# Patient Record
Sex: FEMALE | Race: WHITE | NOT HISPANIC OR LATINO
[De-identification: names, ages, dates, MRNs, and addresses within clinical notes are randomized per-mention and may not be internally consistent; named-entity substitution may affect disease eponyms.]

---

## 2017-02-21 ENCOUNTER — APPOINTMENT (OUTPATIENT)
Dept: ENDOCRINOLOGY | Facility: CLINIC | Age: 57
End: 2017-02-21

## 2017-04-13 ENCOUNTER — APPOINTMENT (OUTPATIENT)
Dept: ENDOCRINOLOGY | Facility: CLINIC | Age: 57
End: 2017-04-13

## 2017-04-13 VITALS
HEART RATE: 71 BPM | HEIGHT: 64 IN | OXYGEN SATURATION: 98 % | WEIGHT: 168 LBS | SYSTOLIC BLOOD PRESSURE: 110 MMHG | BODY MASS INDEX: 28.68 KG/M2 | DIASTOLIC BLOOD PRESSURE: 70 MMHG

## 2017-07-05 ENCOUNTER — MESSAGE (OUTPATIENT)
Age: 57
End: 2017-07-05

## 2017-07-10 ENCOUNTER — APPOINTMENT (OUTPATIENT)
Dept: INTERNAL MEDICINE | Facility: CLINIC | Age: 57
End: 2017-07-10

## 2017-07-10 ENCOUNTER — NON-APPOINTMENT (OUTPATIENT)
Age: 57
End: 2017-07-10

## 2017-07-10 VITALS
DIASTOLIC BLOOD PRESSURE: 80 MMHG | SYSTOLIC BLOOD PRESSURE: 127 MMHG | HEIGHT: 64 IN | BODY MASS INDEX: 28.68 KG/M2 | TEMPERATURE: 98.9 F | HEART RATE: 71 BPM | RESPIRATION RATE: 17 BRPM | WEIGHT: 168 LBS | OXYGEN SATURATION: 99 %

## 2017-07-12 LAB
ALBUMIN SERPL ELPH-MCNC: 4.5 G/DL
ALP BLD-CCNC: 63 U/L
ALT SERPL-CCNC: 19 U/L
ANION GAP SERPL CALC-SCNC: 17 MMOL/L
AST SERPL-CCNC: 18 U/L
BASOPHILS # BLD AUTO: 0.02 K/UL
BASOPHILS NFR BLD AUTO: 0.3 %
BILIRUB SERPL-MCNC: 0.4 MG/DL
BUN SERPL-MCNC: 18 MG/DL
CALCIUM SERPL-MCNC: 9.8 MG/DL
CHLORIDE SERPL-SCNC: 100 MMOL/L
CO2 SERPL-SCNC: 25 MMOL/L
CREAT SERPL-MCNC: 0.8 MG/DL
EOSINOPHIL # BLD AUTO: 0.1 K/UL
EOSINOPHIL NFR BLD AUTO: 1.4 %
GLUCOSE SERPL-MCNC: 75 MG/DL
HCT VFR BLD CALC: 40.4 %
HGB BLD-MCNC: 13 G/DL
IMM GRANULOCYTES NFR BLD AUTO: 0.1 %
LYMPHOCYTES # BLD AUTO: 2.3 K/UL
LYMPHOCYTES NFR BLD AUTO: 31.6 %
MAN DIFF?: NORMAL
MCHC RBC-ENTMCNC: 27.5 PG
MCHC RBC-ENTMCNC: 32.2 GM/DL
MCV RBC AUTO: 85.4 FL
MONOCYTES # BLD AUTO: 0.36 K/UL
MONOCYTES NFR BLD AUTO: 5 %
NEUTROPHILS # BLD AUTO: 4.48 K/UL
NEUTROPHILS NFR BLD AUTO: 61.6 %
PLATELET # BLD AUTO: 399 K/UL
POTASSIUM SERPL-SCNC: 5 MMOL/L
PROT SERPL-MCNC: 7.1 G/DL
RBC # BLD: 4.73 M/UL
RBC # FLD: 14.1 %
SODIUM SERPL-SCNC: 142 MMOL/L
WBC # FLD AUTO: 7.27 K/UL

## 2017-07-26 ENCOUNTER — APPOINTMENT (OUTPATIENT)
Dept: CARDIOLOGY | Facility: CLINIC | Age: 57
End: 2017-07-26

## 2017-07-26 VITALS
RESPIRATION RATE: 16 BRPM | SYSTOLIC BLOOD PRESSURE: 126 MMHG | DIASTOLIC BLOOD PRESSURE: 86 MMHG | BODY MASS INDEX: 28.68 KG/M2 | WEIGHT: 168 LBS | OXYGEN SATURATION: 98 % | HEIGHT: 64 IN | HEART RATE: 82 BPM

## 2017-07-27 ENCOUNTER — OTHER (OUTPATIENT)
Age: 57
End: 2017-07-27

## 2017-07-31 ENCOUNTER — APPOINTMENT (OUTPATIENT)
Dept: CARDIOLOGY | Facility: CLINIC | Age: 57
End: 2017-07-31
Payer: COMMERCIAL

## 2017-07-31 PROCEDURE — 93306 TTE W/DOPPLER COMPLETE: CPT

## 2017-08-16 ENCOUNTER — APPOINTMENT (OUTPATIENT)
Dept: CARDIOLOGY | Facility: CLINIC | Age: 57
End: 2017-08-16

## 2017-08-23 ENCOUNTER — APPOINTMENT (OUTPATIENT)
Dept: CARDIOLOGY | Facility: CLINIC | Age: 57
End: 2017-08-23

## 2018-02-28 ENCOUNTER — MESSAGE (OUTPATIENT)
Age: 58
End: 2018-02-28

## 2018-03-20 ENCOUNTER — APPOINTMENT (OUTPATIENT)
Dept: INTERNAL MEDICINE | Facility: CLINIC | Age: 58
End: 2018-03-20
Payer: COMMERCIAL

## 2018-03-20 ENCOUNTER — LABORATORY RESULT (OUTPATIENT)
Age: 58
End: 2018-03-20

## 2018-03-20 VITALS
TEMPERATURE: 98.2 F | HEIGHT: 64 IN | RESPIRATION RATE: 17 BRPM | SYSTOLIC BLOOD PRESSURE: 132 MMHG | DIASTOLIC BLOOD PRESSURE: 80 MMHG | BODY MASS INDEX: 29.88 KG/M2 | HEART RATE: 60 BPM | OXYGEN SATURATION: 99 % | WEIGHT: 175 LBS

## 2018-03-20 DIAGNOSIS — Z80.8 FAMILY HISTORY OF MALIGNANT NEOPLASM OF OTHER ORGANS OR SYSTEMS: ICD-10-CM

## 2018-03-20 PROCEDURE — 99396 PREV VISIT EST AGE 40-64: CPT

## 2018-03-21 LAB
APPEARANCE: CLEAR
BILIRUBIN URINE: NEGATIVE
BLOOD URINE: ABNORMAL
COLOR: YELLOW
GLUCOSE QUALITATIVE U: NEGATIVE MG/DL
KETONES URINE: NEGATIVE
LEUKOCYTE ESTERASE URINE: NEGATIVE
NITRITE URINE: NEGATIVE
PH URINE: 5.5
PROTEIN URINE: NEGATIVE MG/DL
SPECIFIC GRAVITY URINE: 1.01
UROBILINOGEN URINE: NEGATIVE MG/DL

## 2018-03-22 PROBLEM — Z80.8 FAMILY HISTORY OF MALIGNANT MELANOMA OF SKIN: Status: ACTIVE | Noted: 2018-03-20

## 2018-06-26 ENCOUNTER — APPOINTMENT (OUTPATIENT)
Dept: ENDOCRINOLOGY | Facility: CLINIC | Age: 58
End: 2018-06-26
Payer: COMMERCIAL

## 2018-06-26 VITALS
DIASTOLIC BLOOD PRESSURE: 70 MMHG | WEIGHT: 171 LBS | SYSTOLIC BLOOD PRESSURE: 112 MMHG | RESPIRATION RATE: 16 BRPM | OXYGEN SATURATION: 99 % | BODY MASS INDEX: 29.19 KG/M2 | HEIGHT: 64 IN | HEART RATE: 82 BPM

## 2018-06-26 PROCEDURE — 99213 OFFICE O/P EST LOW 20 MIN: CPT

## 2018-08-09 ENCOUNTER — MESSAGE (OUTPATIENT)
Age: 58
End: 2018-08-09

## 2018-08-09 ENCOUNTER — APPOINTMENT (OUTPATIENT)
Dept: INTERNAL MEDICINE | Facility: CLINIC | Age: 58
End: 2018-08-09
Payer: COMMERCIAL

## 2018-08-09 PROCEDURE — 86580 TB INTRADERMAL TEST: CPT

## 2018-08-11 ENCOUNTER — APPOINTMENT (OUTPATIENT)
Dept: INTERNAL MEDICINE | Facility: CLINIC | Age: 58
End: 2018-08-11

## 2019-05-07 ENCOUNTER — APPOINTMENT (OUTPATIENT)
Dept: INTERNAL MEDICINE | Facility: CLINIC | Age: 59
End: 2019-05-07
Payer: COMMERCIAL

## 2019-05-07 ENCOUNTER — NON-APPOINTMENT (OUTPATIENT)
Age: 59
End: 2019-05-07

## 2019-05-07 VITALS
SYSTOLIC BLOOD PRESSURE: 137 MMHG | OXYGEN SATURATION: 100 % | TEMPERATURE: 98.5 F | HEART RATE: 70 BPM | WEIGHT: 182 LBS | DIASTOLIC BLOOD PRESSURE: 80 MMHG | BODY MASS INDEX: 31.07 KG/M2 | RESPIRATION RATE: 17 BRPM | HEIGHT: 64 IN

## 2019-05-07 PROCEDURE — 99243 OFF/OP CNSLTJ NEW/EST LOW 30: CPT | Mod: 25

## 2019-05-07 PROCEDURE — 93000 ELECTROCARDIOGRAM COMPLETE: CPT

## 2019-05-21 ENCOUNTER — TRANSCRIPTION ENCOUNTER (OUTPATIENT)
Age: 59
End: 2019-05-21

## 2019-05-24 ENCOUNTER — APPOINTMENT (OUTPATIENT)
Dept: INTERNAL MEDICINE | Facility: CLINIC | Age: 59
End: 2019-05-24
Payer: COMMERCIAL

## 2019-05-24 VITALS
WEIGHT: 180 LBS | RESPIRATION RATE: 17 BRPM | BODY MASS INDEX: 30.73 KG/M2 | HEIGHT: 64 IN | TEMPERATURE: 97.8 F | OXYGEN SATURATION: 99 % | DIASTOLIC BLOOD PRESSURE: 80 MMHG | HEART RATE: 68 BPM | SYSTOLIC BLOOD PRESSURE: 138 MMHG

## 2019-05-24 VITALS — DIASTOLIC BLOOD PRESSURE: 80 MMHG | SYSTOLIC BLOOD PRESSURE: 132 MMHG

## 2019-05-24 PROCEDURE — 99396 PREV VISIT EST AGE 40-64: CPT

## 2019-05-27 ENCOUNTER — MESSAGE (OUTPATIENT)
Age: 59
End: 2019-05-27

## 2019-05-27 LAB
25(OH)D3 SERPL-MCNC: 42.6 NG/ML
ALBUMIN SERPL ELPH-MCNC: 4.7 G/DL
ALP BLD-CCNC: 73 U/L
ALT SERPL-CCNC: 26 U/L
ANION GAP SERPL CALC-SCNC: 14 MMOL/L
APPEARANCE: CLEAR
AST SERPL-CCNC: 19 U/L
BASOPHILS # BLD AUTO: 0.04 K/UL
BASOPHILS NFR BLD AUTO: 0.6 %
BILIRUB SERPL-MCNC: 0.4 MG/DL
BILIRUBIN URINE: NEGATIVE
BLOOD URINE: NEGATIVE
BUN SERPL-MCNC: 24 MG/DL
CALCIUM SERPL-MCNC: 10.3 MG/DL
CHLORIDE SERPL-SCNC: 100 MMOL/L
CHOLEST SERPL-MCNC: 276 MG/DL
CHOLEST/HDLC SERPL: 4.8 RATIO
CO2 SERPL-SCNC: 25 MMOL/L
COLOR: NORMAL
CREAT SERPL-MCNC: 0.86 MG/DL
EOSINOPHIL # BLD AUTO: 0.08 K/UL
EOSINOPHIL NFR BLD AUTO: 1.3 %
ESTIMATED AVERAGE GLUCOSE: 103 MG/DL
GLUCOSE QUALITATIVE U: NEGATIVE
GLUCOSE SERPL-MCNC: 85 MG/DL
HBA1C MFR BLD HPLC: 5.2 %
HCT VFR BLD CALC: 47.1 %
HDLC SERPL-MCNC: 57 MG/DL
HGB BLD-MCNC: 14.9 G/DL
IMM GRANULOCYTES NFR BLD AUTO: 0.6 %
KETONES URINE: NEGATIVE
LDLC SERPL CALC-MCNC: 196 MG/DL
LEUKOCYTE ESTERASE URINE: NEGATIVE
LYMPHOCYTES # BLD AUTO: 1.66 K/UL
LYMPHOCYTES NFR BLD AUTO: 26.1 %
MAN DIFF?: NORMAL
MCHC RBC-ENTMCNC: 28.2 PG
MCHC RBC-ENTMCNC: 31.6 GM/DL
MCV RBC AUTO: 89.2 FL
MONOCYTES # BLD AUTO: 0.35 K/UL
MONOCYTES NFR BLD AUTO: 5.5 %
NEUTROPHILS # BLD AUTO: 4.18 K/UL
NEUTROPHILS NFR BLD AUTO: 65.9 %
NITRITE URINE: NEGATIVE
PH URINE: 7
PLATELET # BLD AUTO: 356 K/UL
POTASSIUM SERPL-SCNC: 5.2 MMOL/L
PROT SERPL-MCNC: 7.5 G/DL
PROTEIN URINE: NORMAL
RBC # BLD: 5.28 M/UL
RBC # FLD: 12.8 %
SODIUM SERPL-SCNC: 139 MMOL/L
SPECIFIC GRAVITY URINE: 1.02
T4 FREE SERPL-MCNC: 1.2 NG/DL
TRIGL SERPL-MCNC: 113 MG/DL
TSH SERPL-ACNC: 1.76 UIU/ML
UROBILINOGEN URINE: NORMAL
VIT B12 SERPL-MCNC: 773 PG/ML
WBC # FLD AUTO: 6.35 K/UL

## 2019-05-30 LAB
M TB IFN-G BLD-IMP: NEGATIVE
QUANTIFERON TB PLUS MITOGEN MINUS NIL: 9.3 IU/ML
QUANTIFERON TB PLUS NIL: 0.02 IU/ML
QUANTIFERON TB PLUS TB1 MINUS NIL: 0 IU/ML
QUANTIFERON TB PLUS TB2 MINUS NIL: 0 IU/ML

## 2019-08-30 ENCOUNTER — RESULT REVIEW (OUTPATIENT)
Age: 59
End: 2019-08-30

## 2019-10-01 ENCOUNTER — APPOINTMENT (OUTPATIENT)
Dept: ORTHOPEDIC SURGERY | Facility: CLINIC | Age: 59
End: 2019-10-01
Payer: COMMERCIAL

## 2019-10-01 VITALS
WEIGHT: 180 LBS | HEART RATE: 72 BPM | DIASTOLIC BLOOD PRESSURE: 89 MMHG | SYSTOLIC BLOOD PRESSURE: 162 MMHG | BODY MASS INDEX: 30.73 KG/M2 | HEIGHT: 64 IN

## 2019-10-01 PROCEDURE — 99203 OFFICE O/P NEW LOW 30 MIN: CPT

## 2019-10-07 ENCOUNTER — MESSAGE (OUTPATIENT)
Age: 59
End: 2019-10-07

## 2019-10-31 ENCOUNTER — APPOINTMENT (OUTPATIENT)
Dept: ORTHOPEDIC SURGERY | Facility: CLINIC | Age: 59
End: 2019-10-31
Payer: COMMERCIAL

## 2019-10-31 PROCEDURE — 99213 OFFICE O/P EST LOW 20 MIN: CPT

## 2019-11-04 ENCOUNTER — APPOINTMENT (OUTPATIENT)
Dept: INTERNAL MEDICINE | Facility: CLINIC | Age: 59
End: 2019-11-04
Payer: COMMERCIAL

## 2019-11-04 VITALS
WEIGHT: 180 LBS | BODY MASS INDEX: 30.73 KG/M2 | OXYGEN SATURATION: 98 % | HEART RATE: 73 BPM | HEIGHT: 64 IN | SYSTOLIC BLOOD PRESSURE: 144 MMHG | DIASTOLIC BLOOD PRESSURE: 91 MMHG | TEMPERATURE: 98 F

## 2019-11-04 VITALS — DIASTOLIC BLOOD PRESSURE: 72 MMHG | SYSTOLIC BLOOD PRESSURE: 126 MMHG

## 2019-11-04 PROCEDURE — 99215 OFFICE O/P EST HI 40 MIN: CPT

## 2019-12-05 ENCOUNTER — APPOINTMENT (OUTPATIENT)
Dept: ORTHOPEDIC SURGERY | Facility: CLINIC | Age: 59
End: 2019-12-05
Payer: COMMERCIAL

## 2019-12-05 VITALS
HEIGHT: 64 IN | HEART RATE: 67 BPM | WEIGHT: 180 LBS | SYSTOLIC BLOOD PRESSURE: 130 MMHG | BODY MASS INDEX: 30.73 KG/M2 | DIASTOLIC BLOOD PRESSURE: 83 MMHG

## 2019-12-05 PROCEDURE — 99214 OFFICE O/P EST MOD 30 MIN: CPT

## 2020-08-13 ENCOUNTER — NON-APPOINTMENT (OUTPATIENT)
Age: 60
End: 2020-08-13

## 2020-08-13 ENCOUNTER — APPOINTMENT (OUTPATIENT)
Dept: INTERNAL MEDICINE | Facility: CLINIC | Age: 60
End: 2020-08-13
Payer: COMMERCIAL

## 2020-08-13 VITALS
WEIGHT: 180 LBS | TEMPERATURE: 97.6 F | SYSTOLIC BLOOD PRESSURE: 128 MMHG | HEART RATE: 81 BPM | OXYGEN SATURATION: 98 % | BODY MASS INDEX: 30.73 KG/M2 | RESPIRATION RATE: 16 BRPM | HEIGHT: 64 IN | DIASTOLIC BLOOD PRESSURE: 80 MMHG

## 2020-08-13 DIAGNOSIS — S60.221A CONTUSION OF RIGHT HAND, INITIAL ENCOUNTER: ICD-10-CM

## 2020-08-13 DIAGNOSIS — Z86.69 PERSONAL HISTORY OF OTHER DISEASES OF THE NERVOUS SYSTEM AND SENSE ORGANS: ICD-10-CM

## 2020-08-13 DIAGNOSIS — Z01.818 ENCOUNTER FOR OTHER PREPROCEDURAL EXAMINATION: ICD-10-CM

## 2020-08-13 DIAGNOSIS — R79.9 ABNORMAL FINDING OF BLOOD CHEMISTRY, UNSPECIFIED: ICD-10-CM

## 2020-08-13 DIAGNOSIS — T14.8XXA OTHER INJURY OF UNSPECIFIED BODY REGION, INITIAL ENCOUNTER: ICD-10-CM

## 2020-08-13 DIAGNOSIS — Z00.00 ENCOUNTER FOR GENERAL ADULT MEDICAL EXAMINATION W/OUT ABNORMAL FINDINGS: ICD-10-CM

## 2020-08-13 DIAGNOSIS — R82.90 UNSPECIFIED ABNORMAL FINDINGS IN URINE: ICD-10-CM

## 2020-08-13 DIAGNOSIS — M66.242 SPONTANEOUS RUPTURE OF EXTENSOR TENDONS, LEFT HAND: ICD-10-CM

## 2020-08-13 DIAGNOSIS — Z87.898 PERSONAL HISTORY OF OTHER SPECIFIED CONDITIONS: ICD-10-CM

## 2020-08-13 DIAGNOSIS — Z86.018 PERSONAL HISTORY OF OTHER BENIGN NEOPLASM: ICD-10-CM

## 2020-08-13 PROCEDURE — 99396 PREV VISIT EST AGE 40-64: CPT | Mod: 25

## 2020-08-13 PROCEDURE — G0442 ANNUAL ALCOHOL SCREEN 15 MIN: CPT | Mod: NC

## 2020-08-13 PROCEDURE — G0444 DEPRESSION SCREEN ANNUAL: CPT | Mod: NC

## 2020-08-13 PROCEDURE — G0447 BEHAVIOR COUNSEL OBESITY 15M: CPT | Mod: NC

## 2020-08-13 PROCEDURE — 93000 ELECTROCARDIOGRAM COMPLETE: CPT

## 2020-09-02 ENCOUNTER — APPOINTMENT (OUTPATIENT)
Dept: INTERNAL MEDICINE | Facility: CLINIC | Age: 60
End: 2020-09-02

## 2020-09-03 ENCOUNTER — APPOINTMENT (OUTPATIENT)
Dept: OTOLARYNGOLOGY | Facility: CLINIC | Age: 60
End: 2020-09-03
Payer: COMMERCIAL

## 2020-09-03 VITALS
HEIGHT: 64 IN | WEIGHT: 180 LBS | BODY MASS INDEX: 30.73 KG/M2 | TEMPERATURE: 97.2 F | HEART RATE: 74 BPM | SYSTOLIC BLOOD PRESSURE: 147 MMHG | DIASTOLIC BLOOD PRESSURE: 90 MMHG

## 2020-09-03 PROCEDURE — 92557 COMPREHENSIVE HEARING TEST: CPT

## 2020-09-03 PROCEDURE — 99204 OFFICE O/P NEW MOD 45 MIN: CPT | Mod: 25

## 2020-09-03 PROCEDURE — 92567 TYMPANOMETRY: CPT

## 2020-09-03 PROCEDURE — 69200 CLEAR OUTER EAR CANAL: CPT | Mod: RT

## 2020-09-03 NOTE — HISTORY OF PRESENT ILLNESS
[de-identified] : pt with tinnitus b/l x year worse over the past few months b/l \par also with hearing loss b/l over the past few months getting worse

## 2020-09-03 NOTE — CONSULT LETTER
[FreeTextEntry1] : Dear Dr. IDALIA ELLER \par I had the pleasure of evaluating your patient CARY RODRIGEZ, thank you for allowing us to participate in their care. please see full note detailing our visit below.\par If you have any questions, please do not hesitate to call me and I would be happy to discuss further. \par \par Kyle Schultz M.D.\par Attending Physician,  \par Department of Otolaryngology - Head and Neck Surgery\par [facility] \par Office: [phone]\par Fax: [phone]\par \par

## 2020-09-03 NOTE — PHYSICAL EXAM
[de-identified] : cotton on TM on right  [Midline] : trachea located in midline position [Normal] : orientation to person, place, and time: normal

## 2020-09-03 NOTE — ASSESSMENT
[FreeTextEntry1] : Mild bilateral sensory neural hearing loss on audiological evaluation. At this point clinically not severe and it does not significant limitation in function, discuses what to look for in regards to signs of worsening hearing loss that may require re-evaluation. Also discussed behavioral techniques and environmental controls for improving function . They will follow up as needed or if hearing gets worse.\par \par patient with tone tinnitus - discussed pathophysiology of tinnitus and usual prognosis and treatment. will try otovits and masking techniques. If persist and bothersome can try pitch therapy, or acupuncture\par  right ear cleared out, no more q-tips \par

## 2020-09-03 NOTE — PROCEDURE
[FreeTextEntry3] : Procedure- Removal of right ear FB \par Diagnosis - right ear FB \par Right ear found to have left ear FB, under microscopy removed under direct vision with alligator, canal appeared normal.\par

## 2020-09-16 ENCOUNTER — APPOINTMENT (OUTPATIENT)
Dept: CARDIOLOGY | Facility: CLINIC | Age: 60
End: 2020-09-16
Payer: COMMERCIAL

## 2020-09-16 ENCOUNTER — NON-APPOINTMENT (OUTPATIENT)
Age: 60
End: 2020-09-16

## 2020-09-16 VITALS
HEIGHT: 64 IN | OXYGEN SATURATION: 98 % | SYSTOLIC BLOOD PRESSURE: 147 MMHG | HEART RATE: 69 BPM | DIASTOLIC BLOOD PRESSURE: 84 MMHG | BODY MASS INDEX: 30.73 KG/M2 | WEIGHT: 180 LBS

## 2020-09-16 PROCEDURE — 99213 OFFICE O/P EST LOW 20 MIN: CPT

## 2020-09-16 PROCEDURE — 93000 ELECTROCARDIOGRAM COMPLETE: CPT

## 2020-09-16 RX ORDER — BESIFLOXACIN 6 MG/ML
0.6 SUSPENSION OPHTHALMIC
Qty: 5 | Refills: 0 | Status: DISCONTINUED | COMMUNITY
Start: 2019-05-06 | End: 2020-09-16

## 2020-09-16 RX ORDER — DIFLUPREDNATE 0.5 MG/ML
0.05 EMULSION OPHTHALMIC
Qty: 5 | Refills: 0 | Status: DISCONTINUED | COMMUNITY
Start: 2019-05-06 | End: 2020-09-16

## 2020-09-16 RX ORDER — BROMFENAC 0.76 MG/ML
0.07 SOLUTION/ DROPS OPHTHALMIC
Qty: 5 | Refills: 0 | Status: DISCONTINUED | COMMUNITY
Start: 2019-06-14 | End: 2020-09-16

## 2020-09-16 NOTE — PHYSICAL EXAM
[Normal Appearance] : normal appearance [General Appearance - Well Developed] : well developed [Well Groomed] : well groomed [No Deformities] : no deformities [General Appearance - Well Nourished] : well nourished [Normal Conjunctiva] : the conjunctiva exhibited no abnormalities [General Appearance - In No Acute Distress] : no acute distress [Eyelids - No Xanthelasma] : the eyelids demonstrated no xanthelasmas [No Oral Pallor] : no oral pallor [No Oral Cyanosis] : no oral cyanosis [Normal Oral Mucosa] : normal oral mucosa [No Jugular Venous [place] A Waves] : no jugular venous richmond A waves [Normal Jugular Venous A Waves Present] : normal jugular venous A waves present [Normal Jugular Venous V Waves Present] : normal jugular venous V waves present [Exaggerated Use Of Accessory Muscles For Inspiration] : no accessory muscle use [Respiration, Rhythm And Depth] : normal respiratory rhythm and effort [Heart Rate And Rhythm] : heart rate and rhythm were normal [Heart Sounds] : normal S1 and S2 [Murmurs] : no murmurs present [Auscultation Breath Sounds / Voice Sounds] : lungs were clear to auscultation bilaterally [Abdomen Soft] : soft [Abdomen Tenderness] : non-tender [Gait - Sufficient For Exercise Testing] : the gait was sufficient for exercise testing [Abdomen Mass (___ Cm)] : no abdominal mass palpated [Abnormal Walk] : normal gait [Petechial Hemorrhages (___cm)] : no petechial hemorrhages [Nail Clubbing] : no clubbing of the fingernails [Cyanosis, Localized] : no localized cyanosis [Skin Color & Pigmentation] : normal skin color and pigmentation [Skin Lesions] : no skin lesions [] : no rash [No Venous Stasis] : no venous stasis [Oriented To Time, Place, And Person] : oriented to person, place, and time [No Xanthoma] : no  xanthoma was observed [No Skin Ulcers] : no skin ulcer [Mood] : the mood was normal [Affect] : the affect was normal [No Anxiety] : not feeling anxious

## 2020-09-19 NOTE — REASON FOR VISIT
[Syncope] : syncope [Follow-Up - Clinic] : a clinic follow-up of [Medication Management] : Medication management

## 2020-09-19 NOTE — HISTORY OF PRESENT ILLNESS
[FreeTextEntry1] : Bianka is a 56 y/o woman with Hchol returning for repeat eval\par \par No CP\par No LE edema\par No symptoms\par No syncope\par Labs reviewed\par \par \par

## 2020-09-19 NOTE — DISCUSSION/SUMMARY
[FreeTextEntry1] : Repeat Echo to f/u on thickened mitral valve seen previously\par Discussed her risk of CAD and atherosclerosis with her high chol\par She refuses meds at this time and has agreed to try diet and exercise\par She will recheck her lipids in 2 mo\par She understands that with this level of Chol she puts herself at risk for stroke and MI\par All her questions were answered.

## 2020-11-01 ENCOUNTER — EMERGENCY (EMERGENCY)
Facility: HOSPITAL | Age: 60
LOS: 1 days | Discharge: HOME | End: 2020-11-01
Attending: EMERGENCY MEDICINE
Payer: COMMERCIAL

## 2020-11-01 VITALS
HEIGHT: 64 IN | RESPIRATION RATE: 20 BRPM | TEMPERATURE: 98 F | HEART RATE: 74 BPM | DIASTOLIC BLOOD PRESSURE: 80 MMHG | SYSTOLIC BLOOD PRESSURE: 132 MMHG | OXYGEN SATURATION: 100 % | WEIGHT: 179.9 LBS

## 2020-11-01 VITALS
OXYGEN SATURATION: 100 % | HEART RATE: 82 BPM | DIASTOLIC BLOOD PRESSURE: 75 MMHG | SYSTOLIC BLOOD PRESSURE: 122 MMHG | TEMPERATURE: 98 F | RESPIRATION RATE: 16 BRPM

## 2020-11-01 LAB
ALBUMIN SERPL ELPH-MCNC: 4 G/DL — SIGNIFICANT CHANGE UP (ref 3.3–5)
ALP SERPL-CCNC: 73 U/L — SIGNIFICANT CHANGE UP (ref 40–120)
ALT FLD-CCNC: 33 U/L — SIGNIFICANT CHANGE UP (ref 10–45)
ANION GAP SERPL CALC-SCNC: 12 MMOL/L — SIGNIFICANT CHANGE UP (ref 5–17)
AST SERPL-CCNC: 28 U/L — SIGNIFICANT CHANGE UP (ref 10–40)
BILIRUB SERPL-MCNC: 0.1 MG/DL — LOW (ref 0.2–1.2)
BUN SERPL-MCNC: 22 MG/DL — SIGNIFICANT CHANGE UP (ref 7–23)
CALCIUM SERPL-MCNC: 8.6 MG/DL — SIGNIFICANT CHANGE UP (ref 8.4–10.5)
CHLORIDE SERPL-SCNC: 103 MMOL/L — SIGNIFICANT CHANGE UP (ref 96–108)
CO2 SERPL-SCNC: 24 MMOL/L — SIGNIFICANT CHANGE UP (ref 22–31)
CREAT SERPL-MCNC: 0.89 MG/DL — SIGNIFICANT CHANGE UP (ref 0.5–1.3)
GLUCOSE SERPL-MCNC: 163 MG/DL — HIGH (ref 70–99)
HCT VFR BLD CALC: 42.5 % — SIGNIFICANT CHANGE UP (ref 34.5–45)
HGB BLD-MCNC: 13.6 G/DL — SIGNIFICANT CHANGE UP (ref 11.5–15.5)
MCHC RBC-ENTMCNC: 28.6 PG — SIGNIFICANT CHANGE UP (ref 27–34)
MCHC RBC-ENTMCNC: 32 GM/DL — SIGNIFICANT CHANGE UP (ref 32–36)
MCV RBC AUTO: 89.5 FL — SIGNIFICANT CHANGE UP (ref 80–100)
NRBC # BLD: 0 /100 WBCS — SIGNIFICANT CHANGE UP (ref 0–0)
NT-PROBNP SERPL-SCNC: 41 PG/ML — SIGNIFICANT CHANGE UP (ref 0–300)
PLATELET # BLD AUTO: 299 K/UL — SIGNIFICANT CHANGE UP (ref 150–400)
POTASSIUM SERPL-MCNC: 3.8 MMOL/L — SIGNIFICANT CHANGE UP (ref 3.5–5.3)
POTASSIUM SERPL-SCNC: 3.8 MMOL/L — SIGNIFICANT CHANGE UP (ref 3.5–5.3)
PROT SERPL-MCNC: 6.4 G/DL — SIGNIFICANT CHANGE UP (ref 6–8.3)
RBC # BLD: 4.75 M/UL — SIGNIFICANT CHANGE UP (ref 3.8–5.2)
RBC # FLD: 12.6 % — SIGNIFICANT CHANGE UP (ref 10.3–14.5)
SODIUM SERPL-SCNC: 139 MMOL/L — SIGNIFICANT CHANGE UP (ref 135–145)
TROPONIN T, HIGH SENSITIVITY RESULT: <6 NG/L — SIGNIFICANT CHANGE UP (ref 0–51)
WBC # BLD: 8.78 K/UL — SIGNIFICANT CHANGE UP (ref 3.8–10.5)
WBC # FLD AUTO: 8.78 K/UL — SIGNIFICANT CHANGE UP (ref 3.8–10.5)

## 2020-11-01 PROCEDURE — 71045 X-RAY EXAM CHEST 1 VIEW: CPT | Mod: 26

## 2020-11-01 PROCEDURE — 85027 COMPLETE CBC AUTOMATED: CPT

## 2020-11-01 PROCEDURE — 83880 ASSAY OF NATRIURETIC PEPTIDE: CPT

## 2020-11-01 PROCEDURE — 99284 EMERGENCY DEPT VISIT MOD MDM: CPT | Mod: 25

## 2020-11-01 PROCEDURE — 93005 ELECTROCARDIOGRAM TRACING: CPT

## 2020-11-01 PROCEDURE — 99285 EMERGENCY DEPT VISIT HI MDM: CPT

## 2020-11-01 PROCEDURE — 84484 ASSAY OF TROPONIN QUANT: CPT

## 2020-11-01 PROCEDURE — 93010 ELECTROCARDIOGRAM REPORT: CPT | Mod: NC

## 2020-11-01 PROCEDURE — 71045 X-RAY EXAM CHEST 1 VIEW: CPT

## 2020-11-01 PROCEDURE — 80053 COMPREHEN METABOLIC PANEL: CPT

## 2020-11-01 RX ORDER — SODIUM CHLORIDE 9 MG/ML
1000 INJECTION, SOLUTION INTRAVENOUS ONCE
Refills: 0 | Status: COMPLETED | OUTPATIENT
Start: 2020-11-01 | End: 2020-11-01

## 2020-11-01 RX ADMIN — SODIUM CHLORIDE 4000 MILLILITER(S): 9 INJECTION, SOLUTION INTRAVENOUS at 06:04

## 2020-11-01 NOTE — ED PROVIDER NOTE - PHYSICAL EXAMINATION
Physical Exam:  Gen: NAD, AOx3, non-toxic appearing, able to ambulate without assistance  Head: NCAT  HEENT: EOMI, PEERLA, normal conjunctiva, tongue midline, oral mucosa moist  Lung: CTAB, no respiratory distress, no wheezes/rhonchi/rales B/L, speaking in full sentences  CV: RRR, no murmurs, rubs or gallops, distal pulses 2+ b/l  Abd: soft, NT, ND, no guarding, no rigidity, no rebound tenderness, no CVA tenderness   Neuro: CN II-XII intact, negative Romberg, normal FTN, no nystagmus, normal gait, 5/5 strength b/l, sensation intact b/l  Psych: normal affect, calm

## 2020-11-01 NOTE — ED PROVIDER NOTE - NSFOLLOWUPINSTRUCTIONS_ED_ALL_ED_FT
See your Primary Doctors this week for follow up -- call to discuss.    Stay well hydrated, eat regular healthy meals, get plenty of rest.    See SYNCOPE information and return instructions given to you.    Seek immediate medical care for new/worsening symptoms/concerns.

## 2020-11-01 NOTE — ED PROVIDER NOTE - ATTENDING CONTRIBUTION TO CARE
------------ATTENDING NOTE------------   pt brought to ED by EMS c/o passing out, describes having loose nonbloody diarrhea after eating "something bad", describes bearing down on toilet and feeling lightheaded, tunnel vision, fullness in ears, slumped forward with brief LOC, no trauma, no chest pain/discomfort or SOB/dyspnea or palpitations, asymptomatic on ED arrival, nml VS, nml neuro exam, nml cardiac exam, equal distal pulses, clear chest w/o distress, soft benign abd, tolerating PO -->  - Jeet Cano MD    --------------------------------------------- ------------ATTENDING NOTE------------   pt brought to ED by EMS c/o passing out, describes having loose nonbloody diarrhea after eating "something bad", describes bearing down on toilet and feeling lightheaded, tunnel vision, fullness in ears, slumped forward with brief LOC, no trauma, no chest pain/discomfort or SOB/dyspnea or palpitations, asymptomatic on ED arrival, nml VS, nml neuro exam, nml cardiac exam, equal distal pulses, clear chest w/o distress, soft benign abd, tolerating PO --> labs/imaging wnl, tolerating PO, ambulating in ED w/o symptoms, in depth dw pt about ddx, tx, escalera, continued close outpt fu.  - Jeet Cano MD    ---------------------------------------------

## 2020-11-01 NOTE — ED PROVIDER NOTE - OBJECTIVE STATEMENT
59yo female p/w syncope during BM at 430AM. Has had multiple episodes NB diarrhea overnight. Denies head trauma, tongue biting, incontinence, h/o seizures, change in vision, numbness/weakness, difficulty walking, fever, N/V. Has echo scheduled in 2 weeks with cardiologist.

## 2020-11-01 NOTE — ED ADULT NURSE NOTE - OBJECTIVE STATEMENT
61 y/o F A&Ox3 presents to ED via EMS s/p fall after syncopal episode last night. Patient states that she has had diarrhea since last night, and was leaving the bathroom when she felt dizzy and fell to the floor. Patient states her  immediately came to her, and her down time was less than one minute. +LOC, patient does not believe that she hit her head. No wounds, bruising, active bleeding noted to head. At this time, denies any HA, dizziness at this time. EKG completed, NSR noted with HR in the 70s, patient placed on cardiac monitor. Lungs clear b/l, S1 S2 noted on auscultation, pulse ox 100% on room air. +ROM x4 extremities, + strong hand  b/l, denies numbness, tingling. Patient denies chest pain, SOB, fevers, chills. Patient placed in position of comfort, bed locked and in lowest position, call bell within reach.

## 2020-11-01 NOTE — ED PROVIDER NOTE - PATIENT PORTAL LINK FT
You can access the FollowMyHealth Patient Portal offered by [facility] by registering at the following website: [email or web address]. By joining [employer]’s FollowMyHealth portal, you will also be able to view your health information using other applications (apps) compatible with our system.

## 2020-11-11 ENCOUNTER — APPOINTMENT (OUTPATIENT)
Dept: CV DIAGNOSITCS | Facility: HOSPITAL | Age: 60
End: 2020-11-11

## 2020-11-11 ENCOUNTER — OUTPATIENT (OUTPATIENT)
Dept: OUTPATIENT SERVICES | Facility: HOSPITAL | Age: 60
LOS: 1 days | End: 2020-11-11
Payer: COMMERCIAL

## 2020-11-11 DIAGNOSIS — E78.00 PURE HYPERCHOLESTEROLEMIA, UNSPECIFIED: ICD-10-CM

## 2020-11-11 PROBLEM — Z78.9 OTHER SPECIFIED HEALTH STATUS: Chronic | Status: ACTIVE | Noted: 2020-11-01

## 2020-11-11 PROCEDURE — 93306 TTE W/DOPPLER COMPLETE: CPT

## 2020-11-11 PROCEDURE — 93306 TTE W/DOPPLER COMPLETE: CPT | Mod: 26

## 2020-12-02 ENCOUNTER — APPOINTMENT (OUTPATIENT)
Dept: INTERNAL MEDICINE | Facility: CLINIC | Age: 60
End: 2020-12-02
Payer: COMMERCIAL

## 2020-12-02 VITALS — DIASTOLIC BLOOD PRESSURE: 80 MMHG | HEART RATE: 70 BPM | SYSTOLIC BLOOD PRESSURE: 134 MMHG | RESPIRATION RATE: 16 BRPM

## 2020-12-02 VITALS
WEIGHT: 180 LBS | TEMPERATURE: 98.4 F | HEIGHT: 64 IN | OXYGEN SATURATION: 97 % | HEART RATE: 72 BPM | SYSTOLIC BLOOD PRESSURE: 170 MMHG | DIASTOLIC BLOOD PRESSURE: 90 MMHG | BODY MASS INDEX: 30.73 KG/M2

## 2020-12-02 DIAGNOSIS — Z12.11 ENCOUNTER FOR SCREENING FOR MALIGNANT NEOPLASM OF COLON: ICD-10-CM

## 2020-12-02 DIAGNOSIS — R92.2 INCONCLUSIVE MAMMOGRAM: ICD-10-CM

## 2020-12-02 DIAGNOSIS — Z86.010 PERSONAL HISTORY OF COLONIC POLYPS: ICD-10-CM

## 2020-12-02 DIAGNOSIS — Z23 ENCOUNTER FOR IMMUNIZATION: ICD-10-CM

## 2020-12-02 DIAGNOSIS — K64.8 OTHER HEMORRHOIDS: ICD-10-CM

## 2020-12-02 DIAGNOSIS — Z86.2 PERSONAL HISTORY OF DISEASES OF THE BLOOD AND BLOOD-FORMING ORGANS AND CERTAIN DISORDERS INVOLVING THE IMMUNE MECHANISM: ICD-10-CM

## 2020-12-02 DIAGNOSIS — T16.1XXA FOREIGN BODY IN RIGHT EAR, INITIAL ENCOUNTER: ICD-10-CM

## 2020-12-02 DIAGNOSIS — Z12.39 ENCOUNTER FOR OTHER SCREENING FOR MALIGNANT NEOPLASM OF BREAST: ICD-10-CM

## 2020-12-02 DIAGNOSIS — Z13.820 ENCOUNTER FOR SCREENING FOR OSTEOPOROSIS: ICD-10-CM

## 2020-12-02 DIAGNOSIS — Z82.49 FAMILY HISTORY OF ISCHEMIC HEART DISEASE AND OTHER DISEASES OF THE CIRCULATORY SYSTEM: ICD-10-CM

## 2020-12-02 DIAGNOSIS — Z12.4 ENCOUNTER FOR SCREENING FOR MALIGNANT NEOPLASM OF CERVIX: ICD-10-CM

## 2020-12-02 DIAGNOSIS — Z12.83 ENCOUNTER FOR SCREENING FOR MALIGNANT NEOPLASM OF SKIN: ICD-10-CM

## 2020-12-02 DIAGNOSIS — H91.93 UNSPECIFIED HEARING LOSS, BILATERAL: ICD-10-CM

## 2020-12-02 PROCEDURE — G0447 BEHAVIOR COUNSEL OBESITY 15M: CPT

## 2020-12-02 PROCEDURE — 99072 ADDL SUPL MATRL&STAF TM PHE: CPT

## 2020-12-02 PROCEDURE — 90471 IMMUNIZATION ADMIN: CPT

## 2020-12-02 PROCEDURE — G0444 DEPRESSION SCREEN ANNUAL: CPT | Mod: 59

## 2020-12-02 PROCEDURE — 99214 OFFICE O/P EST MOD 30 MIN: CPT | Mod: 25

## 2020-12-02 PROCEDURE — 90715 TDAP VACCINE 7 YRS/> IM: CPT

## 2020-12-02 PROCEDURE — 99203 OFFICE O/P NEW LOW 30 MIN: CPT | Mod: 25

## 2020-12-06 PROBLEM — H91.93 BILATERAL HEARING LOSS, UNSPECIFIED HEARING LOSS TYPE: Status: RESOLVED | Noted: 2020-08-13 | Resolved: 2020-12-06

## 2020-12-06 PROBLEM — Z86.2 HISTORY OF POLYCYTHEMIA: Status: RESOLVED | Noted: 2019-05-27 | Resolved: 2020-12-06

## 2020-12-06 PROBLEM — Z13.820 OSTEOPOROSIS SCREENING: Status: RESOLVED | Noted: 2020-08-13 | Resolved: 2020-12-06

## 2020-12-06 PROBLEM — Z82.49 FAMILY HISTORY OF HYPERTENSION: Status: ACTIVE | Noted: 2020-12-06

## 2020-12-06 PROBLEM — Z23 ENCOUNTER FOR IMMUNIZATION: Status: RESOLVED | Noted: 2020-12-02 | Resolved: 2020-12-06

## 2020-12-06 PROBLEM — T16.1XXA EAR FOREIGN BODY, RIGHT, INITIAL ENCOUNTER: Status: RESOLVED | Noted: 2020-09-03 | Resolved: 2020-12-06

## 2020-12-06 PROBLEM — R92.2 DENSE BREASTS: Status: RESOLVED | Noted: 2020-08-13 | Resolved: 2020-12-06

## 2020-12-06 PROBLEM — Z12.39 BREAST CANCER SCREENING: Status: RESOLVED | Noted: 2020-08-13 | Resolved: 2020-12-06

## 2020-12-06 PROBLEM — Z12.4 CERVICAL CANCER SCREENING: Status: RESOLVED | Noted: 2020-08-13 | Resolved: 2020-12-06

## 2020-12-06 PROBLEM — Z12.11 COLON CANCER SCREENING: Status: RESOLVED | Noted: 2020-08-13 | Resolved: 2020-12-06

## 2020-12-06 PROBLEM — Z12.83 SKIN CANCER SCREENING: Status: RESOLVED | Noted: 2020-08-13 | Resolved: 2020-12-06

## 2020-12-06 NOTE — REVIEW OF SYSTEMS
[Negative] : Integumentary [FreeTextEntry4] : see HPI [de-identified] : see HPI [de-identified] : see HPI

## 2020-12-06 NOTE — ASSESSMENT
[FreeTextEntry1] : \par 61 yo F pmhx HLD, hx syncope (hx w/u with echo showing MV thickening), hx thyroiditis, thyroid nodules, osteopenia, depression, obesity to establish care\par \par hx syncope (hx w/u with echo showing MV thickening), dizziness/vertigo (possible BPPV)- reports hx syncope (x2 LOC and near syncope x1)- recalls all except recent in 10/20 episode had onset after blood donation, 10/20 episode s/p ER eval thought vasovagal.  \par -11/20 echo: EF 65%, nl LV/RV fxn and size, min AR, nl MV\par -followed by cardio, last seen 9/20- noted hx syncope and HLD and declined Rx for HLD. Repeat echo ordered- told nl since. Advised repeat lipids in 2mo- pending. F/U pending.\par -neuro referral for eval\par -hx negative CT head 2014, declines repeat- defers to neurology eval\par -advised to stay well hydrated\par -trial of Epely maneuver at home, pt handout given\par -advised prompt medical eval if LOC recurs or new neurologic sx's arise\par \par HLD- 8/20 Tchol 245   HDL 55\par -followed by cardio, last seen 9/20- noted hx syncope and HLD and declined Rx for HLD. Repeat echo ordered- told nl since. Advised repeat lipids in 2mo- pending (slip given today). F/U pending.\par -low fat diet, exercise and wt loss advised\par -nutrition referral given\par \par hx tinnitus, hearing loss-\par -seen by ENT 9/20, told has mild hearing loss but no intervention advised. \par \par osteopenia, hx thyroid nodules-\par -8/20 TSH wnl, check repeat\par -8/20 vit d wnl\par -hx DEXA 7/18, repeat pending \par -hx thyroid US 6/18, Rx for repeat given\par -hx endo f/u, last seen 10/18, referral for f/u with new given per request\par -CA/D, exercise encouraged\par \par depression- feels mood is stable; denies HI/SI or panic attacks\par -on fluoxetine 40mg, taking same dose x 2 yrs \par -doing therapy (SW) weekly, feels is help\par \par obesity -BMI 30\par -risks of obesity discussed\par -benefits of weight loss discussed\par -low fat, low cholesterol, low carbohydrate diet advised\par -smaller portion sizes encouraged\par -advised avoidance of sugary drinks\par -aerobic exercise encouraged\par -weight loss advised\par -nutrition referral given\par \par MISC:  Continued social distancing and measure for covid19 prevention encouraged.  \par -declines check covid19 AB screen.\par \par \par HCM\par -hx CPE 8/20 by prior PMD, yearly advised\par -8/20 cbc/cmp/TSH/A1c/vit d wnl\par -hx negative hep C screening 8/11\par -hx flu shot 10/20\par -Tdap today\par -hx shingles vaccine 7/19\par -4/13 labs: immune to MMR/Varicella\par -hx negative PAP 8/19 by GYN, Dr. Gee\par -hx negative mammo/US 7/19, Rx's for repeat given\par -hx screening colonoscopy 8/20: no polyps (hx polyps) rec: 5 yrs per pt.  Asked to forward record.\par -followed by derm, hx eval 8/20- yearly skin screening advised.  Regular use of sun block for skin cancer prevention advised.\par -yearly eye screening advised\par -yearly optho screening advised, hx eval 2020\par

## 2020-12-06 NOTE — HISTORY OF PRESENT ILLNESS
[Postmenopausal] : the patient is postmenopausal [Hearing Loss] : She has hearing loss [Spouse] : spouse [] :  [Working Full Time] : working full time [Occupation ___] : occupation: [unfilled] [Never Smoked Cigarettes] : has never smoked cigarettes [Occasional Use] : occasional alcohol use [Never] : has never used illicit drugs [Reg. Dental Visits] : She has regular dental visits [FreeTextEntry1] : \par Wanted new PMD. [Binge Drinking] : denies binge drinking [Patient Concern] : no personal concern about alcohol use [Family Concern] : no family concern about alcohol use [Vision Problems] : She denies vision problems [de-identified] : 8/20, Dr. Carlos [de-identified] : hx flu shot 10/20, hx Td 4/01 and 9/10, hx shingles vaccine 7/19 [de-identified] : \par 59 yo F pmhx HLD, hx syncope (hx w/u with echo showing MV thickening), hx thyroiditis, thyroid nodules, osteopenia, depression, obesity to establish care\par \par Feeling well.\par Not fasting.\par \par Reports hx CPE 8/20 with prior PMD.\par \par Reports is socially distancing and using precautions for covid prevention.RN at elementary school- in person.\par Denies sick or covid positive contacts.\par Denies fever, chills, cough or sob.\par -hx negative covid pcr pre -colonoscopy in 8/20- told negative\par \par Reports hx ER visit in 10/20 after LOC while at home- notes had diarrhea x 2 a/w nausea after eating bone marrow made at home (no other in home had this) felt lightheaded after bout of diarrhea and passed out, awoke on the bathroom floor,  in other room who called EMS.  No vomiting, fever, focal bodily injury, head trauma, postictal state, incontinence or tongue biting.\par -states given IVFs and zofran in ER, had cxr- told negative.  Had labs- told nl. Dx'd as vasovagal syncope and d/c'd home after few hours.  States no new meds given\par -recalls sx's resolved by next day and denies sx recurrence since\par \par HLD, hx syncope- reports hx syncope (x2 LOC and near syncope x1)-  recalls all except recent episode had onset after blood donation \par -followed by cardio, last seen 9/20- noted hx syncope and HLD and declined Rx for HLD. Repeat echo ordered- told nl since. Advised repeat lipids in 2mo- pending.  F/U pending.\par -reports not adherent with low fat diet\par -exercise: walks daily x 30 mins, also aerobic home video 2-3x/wk x 45 min - all done w/o sx's or limitations\par -reports wt stable in past year\par \par Reports chronic hx dizziness- mainly room spinning sensation on/off x many yrs\par -feels onset is mainly with laying flat or with rapid head movements\par -denies HA, vision issues, dysarthria, focal weakness or ear sx's with it\par -reports hx negative CT head at first onset of syncope 4 yrs ago after donated blood- told nl\par \par Reports hx tinnitus, hearing loss-\par -seen by ENT 9/20, told has mild hearing loss but no intervention advised.  \par \par osteopenia, hx thyroid nodules-\par -hx DEXA 7/18, repeat pending per prior PMD\par -on CA/D\par -exercises\par -hx thyroid US 6/18\par -hx endo f/u, last seen 10/18, needs new\par \par depression- feels mood is stable; denies HI/SI or panic attacks\par -on fluoxetine 40mg, taking same dose x 2 yrs \par -doing therapy (SW) weekly, feels is help\par -sleeping well, has good appetite\par \par Denies  complaints.

## 2020-12-06 NOTE — PHYSICAL EXAM
[No Acute Distress] : no acute distress [Well-Appearing] : well-appearing [Normal Sclera/Conjunctiva] : normal sclera/conjunctiva [PERRL] : pupils equal round and reactive to light [EOMI] : extraocular movements intact [Normal Outer Ear/Nose] : the outer ears and nose were normal in appearance [Normal Oropharynx] : the oropharynx was normal [Normal TMs] : both tympanic membranes were normal [Normal Nasal Mucosa] : the nasal mucosa was normal [No Lymphadenopathy] : no lymphadenopathy [Supple] : supple [Thyroid Normal, No Nodules] : the thyroid was normal and there were no nodules present [No Respiratory Distress] : no respiratory distress  [Clear to Auscultation] : lungs were clear to auscultation bilaterally [Normal Rate] : normal rate  [Regular Rhythm] : with a regular rhythm [Normal S1, S2] : normal S1 and S2 [No Murmur] : no murmur heard [No Carotid Bruits] : no carotid bruits [Pedal Pulses Present] : the pedal pulses are present [No Edema] : there was no peripheral edema [Soft] : abdomen soft [Non Tender] : non-tender [No HSM] : no HSM [Normal Supraclavicular Nodes] : no supraclavicular lymphadenopathy [Normal Posterior Cervical Nodes] : no posterior cervical lymphadenopathy [Normal Anterior Cervical Nodes] : no anterior cervical lymphadenopathy [No CVA Tenderness] : no CVA  tenderness [No Spinal Tenderness] : no spinal tenderness [No Joint Swelling] : no joint swelling [Grossly Normal Strength/Tone] : grossly normal strength/tone [No Rash] : no rash [No Focal Deficits] : no focal deficits [Normal Gait] : normal gait [Normal Affect] : the affect was normal [Alert and Oriented x3] : oriented to person, place, and time [de-identified] : no sinus tenderness [de-identified] : scattered freckles [de-identified] : mild b/l horizontal nystagmus

## 2020-12-06 NOTE — HEALTH RISK ASSESSMENT
[Patient reported mammogram was normal] : Patient reported mammogram was normal [Patient reported PAP Smear was normal] : Patient reported PAP Smear was normal [0] : 2) Feeling down, depressed, or hopeless: Not at all (0) [[id number]] : 0 [MammogramDate] : 07/19 [MammogramComments] : repeat pending [PapSmearDate] : 08/19 [BoneDensityDate] : 07/18 [BoneDensityComments] : osteopenia, repeat pending [ColonoscopyDate] : 08/20 [ColonoscopyComments] : no polyps (hx polyps) rec: 5 yrs per pt [HIVDate] : 08/11 [HIVComments] : negative [HepatitisCDate] : 12/16 [HepatitisCComments] : negative

## 2020-12-17 ENCOUNTER — NON-APPOINTMENT (OUTPATIENT)
Age: 60
End: 2020-12-17

## 2020-12-29 ENCOUNTER — APPOINTMENT (OUTPATIENT)
Dept: CARDIOLOGY | Facility: CLINIC | Age: 60
End: 2020-12-29

## 2020-12-30 ENCOUNTER — APPOINTMENT (OUTPATIENT)
Dept: NEUROLOGY | Facility: CLINIC | Age: 60
End: 2020-12-30
Payer: COMMERCIAL

## 2020-12-30 VITALS
HEIGHT: 64 IN | WEIGHT: 180 LBS | SYSTOLIC BLOOD PRESSURE: 139 MMHG | DIASTOLIC BLOOD PRESSURE: 90 MMHG | BODY MASS INDEX: 30.73 KG/M2 | HEART RATE: 72 BPM

## 2020-12-30 VITALS — TEMPERATURE: 97 F

## 2020-12-30 PROCEDURE — 99204 OFFICE O/P NEW MOD 45 MIN: CPT

## 2020-12-30 PROCEDURE — 99072 ADDL SUPL MATRL&STAF TM PHE: CPT

## 2021-01-02 NOTE — HISTORY OF PRESENT ILLNESS
[FreeTextEntry1] : *** 12/30/2020  ***\par Ms. RODRIGEZ had h/o syncope - 3 times in life - 1 occ immediately after giving blood, walking out of donation center, 1 follow-ing day after giving blood, went to [facility] snow, most recently in Nov before election - had diarrhea that day, awoke in night to go to [facility] and awoke on floor (thought she probably sat down), had to go to BR again (diarrhea) and felt lightheaded and syncopized. \par Ms. RODRIGEZ also notes h/o frequent motion sickness, laying flat or sitting up quickly can trigger vertigo,  now noted that it can occur with rapid head movements. Vertigo will last a minute or so when it occurs. Vertigo will recurr several times per week.  \par \par Ms. RODRIGEZ has also seen ENT for tinnitus and had hearing test and was told she had borderline hearing loss. \par \par meds - prozac, asa 81, calcium, mvi\par all - sulfa, flagyl\par \par soc - no tobacco, social ETOH, works as school nurse,\par FH - no h/o neurological illnesses. F- skin CA, CAD/MI\par ROS - negative x 14 systems. - chronic tinnitus.\par \par

## 2021-01-02 NOTE — PHYSICAL EXAM
[FreeTextEntry1] : MS: alert & oriented to person, place time, good fund of knowledge and recall for recent and remote events. \par CN: VFF to confrontation, EOM full without nystagmus, PERRL, V1-V3 intact to light touch, face symmetrical, hears finger rub bilaterally, palate elevates symmetrically, shoulders elevate symmetrically, tongue midline\par MOTOR: normal tone x 4 extremities, Power 5/5 proximally and distally bilaterally, no drift, normal rapid alternating movements. \par SENSORY: intact LT x 4 extremities\par REFLEXES: biceps 2+ bilaterally, triceps 2+ bilaterally, brachioradialis 2+ bilaterally, patella 2+ bilaterally, ankle 2+ bilaterally\par COORD: normal FNF, no tremor or dysmetria\par GAIT: normal base, Romberg negative, normal gait, able to  tandem. \par \par Zach-Hallpike produced subjective transient vertigo bilaterally that resolved after a few minutes. No nystagmus noted thought patient made many saccades while symptomatic.

## 2021-01-07 ENCOUNTER — NON-APPOINTMENT (OUTPATIENT)
Age: 61
End: 2021-01-07

## 2021-01-10 ENCOUNTER — RESULT REVIEW (OUTPATIENT)
Age: 61
End: 2021-01-10

## 2021-01-10 ENCOUNTER — OUTPATIENT (OUTPATIENT)
Dept: OUTPATIENT SERVICES | Facility: HOSPITAL | Age: 61
LOS: 1 days | End: 2021-01-10
Payer: COMMERCIAL

## 2021-01-10 ENCOUNTER — APPOINTMENT (OUTPATIENT)
Dept: MRI IMAGING | Facility: CLINIC | Age: 61
End: 2021-01-10
Payer: COMMERCIAL

## 2021-01-10 DIAGNOSIS — R42 DIZZINESS AND GIDDINESS: ICD-10-CM

## 2021-01-10 PROCEDURE — 70551 MRI BRAIN STEM W/O DYE: CPT | Mod: 26

## 2021-01-10 PROCEDURE — 70544 MR ANGIOGRAPHY HEAD W/O DYE: CPT | Mod: 26,59

## 2021-01-10 PROCEDURE — 70544 MR ANGIOGRAPHY HEAD W/O DYE: CPT

## 2021-01-10 PROCEDURE — 70551 MRI BRAIN STEM W/O DYE: CPT

## 2021-01-11 ENCOUNTER — NON-APPOINTMENT (OUTPATIENT)
Age: 61
End: 2021-01-11

## 2021-01-12 ENCOUNTER — TRANSCRIPTION ENCOUNTER (OUTPATIENT)
Age: 61
End: 2021-01-12

## 2021-01-13 ENCOUNTER — TRANSCRIPTION ENCOUNTER (OUTPATIENT)
Age: 61
End: 2021-01-13

## 2021-01-15 ENCOUNTER — TRANSCRIPTION ENCOUNTER (OUTPATIENT)
Age: 61
End: 2021-01-15

## 2021-01-19 ENCOUNTER — TRANSCRIPTION ENCOUNTER (OUTPATIENT)
Age: 61
End: 2021-01-19

## 2021-02-10 ENCOUNTER — APPOINTMENT (OUTPATIENT)
Dept: INTERNAL MEDICINE | Facility: CLINIC | Age: 61
End: 2021-02-10
Payer: COMMERCIAL

## 2021-02-10 VITALS
BODY MASS INDEX: 31.24 KG/M2 | SYSTOLIC BLOOD PRESSURE: 140 MMHG | TEMPERATURE: 98.2 F | WEIGHT: 182 LBS | HEART RATE: 70 BPM | DIASTOLIC BLOOD PRESSURE: 80 MMHG | OXYGEN SATURATION: 98 %

## 2021-02-10 VITALS — RESPIRATION RATE: 16 BRPM | SYSTOLIC BLOOD PRESSURE: 134 MMHG | DIASTOLIC BLOOD PRESSURE: 80 MMHG

## 2021-02-10 PROCEDURE — 99072 ADDL SUPL MATRL&STAF TM PHE: CPT

## 2021-02-10 PROCEDURE — 99214 OFFICE O/P EST MOD 30 MIN: CPT | Mod: 25

## 2021-02-10 PROCEDURE — G0447 BEHAVIOR COUNSEL OBESITY 15M: CPT | Mod: 59

## 2021-02-11 ENCOUNTER — TRANSCRIPTION ENCOUNTER (OUTPATIENT)
Age: 61
End: 2021-02-11

## 2021-02-12 ENCOUNTER — TRANSCRIPTION ENCOUNTER (OUTPATIENT)
Age: 61
End: 2021-02-12

## 2021-02-13 NOTE — ASSESSMENT
[FreeTextEntry1] : \par 61 yo F pmhx HLD, hx syncope (hx w/u with echo showing MV thickening), hx thyroiditis, thyroid nodules, osteopenia, depression, obesity for f/u\par \par hx recurrent syncope (hx w/u with echo showing MV thickening), dizziness/vertigo (possible BPPV)- \par -reports hx syncope (x2 LOC and near syncope x1)- recalls all except recent in 10/20 episode had onset after blood donation, 10/20 episode s/p ER eval thought vasovagal.  \par -hx negative CT head 2014\par -11/20 echo: EF 65%, nl LV/RV fxn and size, min AR, nl MV\par -followed by cardio, last seen 9/20- noted hx syncope and HLD and declined Rx for HLD. Repeat echo ordered- told nl since. Advised repeat lipids in 2mo- pending. F/U pending.\par -following with neuro, seen 12/20 with MRI/A ordered, done 1/21 and notified mainly age-related changes, no acute pathology.  Has f/u 2/19/21.\par -advised to stay well hydrated\par -advised prompt medical eval if LOC recurs or new neurologic sx's arise\par \par HLD- 8/20 Tchol 245   HDL 55\par -followed by cardio, last seen 9/20- noted hx syncope and HLD and declined Rx for HLD. Repeat echo ordered- told nl since. Advised repeat lipids in 2mo. F/U pending.\par -states had repeat lipids recently at outside labs- will obtain records and f/u with pt\par -low fat diet, exercise and wt loss advised\par -nutrition referral given- pending\par \par hx tinnitus, hearing loss-\par -seen by ENT 9/20, told has mild hearing loss but no intervention advised. \par \par osteopenia, hx thyroid nodules-\par -8/20 TSH wnl\par -8/20 vit d wnl\par -12/20 DEXA osteopenia\par -12/20 thyroid US: reports last done 2010, not 8/20 as per report)- will forward prior endo/US records\par multiple thyroid nodules: on right [id number], [id number] and 4x3mm colloid follicle; on left: [id number], 5x4mm\par -hx endo f/u, last seen 10/18, referral for f/u with new given prior- has appt 2/16/21\par -CA/D, exercise encouraged\par \par depression- feels mood is stable; denies HI/SI or panic attacks\par -on fluoxetine 40mg, taking same dose x 2 yrs \par -doing therapy (SW) weekly, feels is help\par \par obesity -BMI 31\par -risks of obesity discussed\par -benefits of weight loss discussed\par -low fat, low cholesterol, low carbohydrate diet advised\par -smaller portion sizes encouraged\par -advised avoidance of sugary drinks\par -aerobic exercise encouraged\par -weight loss advised\par -nutrition referral given prior- pending\par \par MISC:  Continued social distancing and measure for covid19 prevention encouraged.  \par -declined check covid19 AB screen.\par -hx negative covid pcr pre -colonoscopy in 8/20- told negative\par -hx covid vaccine series (moderna)- finished 2/5/21\par \par \par HCM\par -hx CPE 8/20 by prior PMD, yearly advised\par -8/20 cbc/cmp/TSH/A1c/vit d wnl\par -hx negative hep C screening 8/11\par -hx flu shot 10/20\par -hx Tdap 12/20\par -hx shingles vaccine 7/19\par -4/13 labs: immune to MMR/Varicella\par -hx negative PAP 8/19 by GYN, Dr. Gee\par -hx negative mammo/US 12/20\par -hx screening colonoscopy 8/20: no polyps (hx polyps) rec: 5 yrs per pt.  Asked to forward record.\par -followed by derm, hx eval 8/20- yearly skin screening advised.  Regular use of sun block for skin cancer prevention advised.\par -yearly optho screening advised, hx eval 2020\par

## 2021-02-13 NOTE — PHYSICAL EXAM
[Well-Appearing] : well-appearing [Normal Sclera/Conjunctiva] : normal sclera/conjunctiva [PERRL] : pupils equal round and reactive to light [EOMI] : extraocular movements intact [Normal Outer Ear/Nose] : the outer ears and nose were normal in appearance [Normal Oropharynx] : the oropharynx was normal [Normal Nasal Mucosa] : the nasal mucosa was normal [Supple] : supple [No Lymphadenopathy] : no lymphadenopathy [Thyroid Normal, No Nodules] : the thyroid was normal and there were no nodules present [No Respiratory Distress] : no respiratory distress  [Clear to Auscultation] : lungs were clear to auscultation bilaterally [Normal Rate] : normal rate  [Regular Rhythm] : with a regular rhythm [Normal S1, S2] : normal S1 and S2 [No Murmur] : no murmur heard [No Carotid Bruits] : no carotid bruits [Pedal Pulses Present] : the pedal pulses are present [No Edema] : there was no peripheral edema [Soft] : abdomen soft [Non Tender] : non-tender [No HSM] : no HSM [Normal Supraclavicular Nodes] : no supraclavicular lymphadenopathy [Normal Posterior Cervical Nodes] : no posterior cervical lymphadenopathy [Normal Anterior Cervical Nodes] : no anterior cervical lymphadenopathy [No CVA Tenderness] : no CVA  tenderness [No Joint Swelling] : no joint swelling [No Spinal Tenderness] : no spinal tenderness [Grossly Normal Strength/Tone] : grossly normal strength/tone [No Rash] : no rash [No Focal Deficits] : no focal deficits [Normal Gait] : normal gait [Normal Affect] : the affect was normal [Alert and Oriented x3] : oriented to person, place, and time [de-identified] : scattered freckles

## 2021-02-13 NOTE — REVIEW OF SYSTEMS
[Negative] : Integumentary [FreeTextEntry4] : see HPI [de-identified] : see HPI [de-identified] : see HPI

## 2021-02-13 NOTE — HISTORY OF PRESENT ILLNESS
[FreeTextEntry1] : f/u\par  [de-identified] : \par 59 yo F pmhx HLD, hx syncope (hx w/u with echo showing MV thickening), hx thyroiditis, thyroid nodules, osteopenia, depression, obesity for f/u\par \par Feeling well.\par States had labs done few days ago at [facility] in [place]- results pending\par \par Reports is socially distancing and using precautions for covid prevention. 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  Notes has eaten a meal 2 hrs prior. \par Reports chronic hx dizziness- mainly room spinning sensation on/off x many yrs\par -feels onset is mainly with laying flat or with rapid head movements\par -denies HA, vision issues, dysarthria, focal weakness or ear sx's with it\par -reports hx negative CT head at first onset of syncope 4 yrs ago after donated blood- told nl\par -11/20 echo: EF 65%, nl LV/RV fxn and size, min AR, nl MV\par -followed by cardio, last seen 9/20- noted hx syncope and HLD and declined Rx for HLD. Repeat echo ordered- told nl since. Advised repeat lipids in 2mo- pending. F/U pending.\par -following with neuro, seen 12/20 with MRI/A ordered, done 1/21 and notified mainly age-related changes, no acute pathology.  Has f/u 2/19/21.\par \par HLD, hx syncope- \par -followed by cardio, last seen 9/20- noted hx syncope and HLD and declined Rx for HLD. Repeat echo ordered- told nl since. Advised repeat lipids in 2mo- pending.  F/U pending.\par -reports not adherent with low fat diet\par -exercise: walks daily x 30 mins, also aerobic home video 2-3x/wk x 45 min - all done w/o sx's or limitations\par -reports wt stable in past year\par \par Reports hx tinnitus, hearing loss-\par -seen by ENT 9/20, told has mild hearing loss but no intervention advised.  \par \par osteopenia, hx thyroid nodules-\par -hx DEXA 12/20\par -on CA/D\par -exercises\par -hx thyroid US 12/20\par -hx endo f/u, last seen 10/18, eval by new pending 2/21\par \par depression- feels mood is stable; denies HI/SI or panic attacks\par -on fluoxetine 40mg, taking same dose x 2 yrs \par -doing therapy (SW) weekly, feels is help\par -sleeping well, has good appetite\par \par Denies  complaints.

## 2021-02-16 ENCOUNTER — TRANSCRIPTION ENCOUNTER (OUTPATIENT)
Age: 61
End: 2021-02-16

## 2021-02-16 ENCOUNTER — APPOINTMENT (OUTPATIENT)
Dept: ENDOCRINOLOGY | Facility: CLINIC | Age: 61
End: 2021-02-16
Payer: COMMERCIAL

## 2021-02-16 VITALS
HEIGHT: 64 IN | SYSTOLIC BLOOD PRESSURE: 132 MMHG | BODY MASS INDEX: 31.07 KG/M2 | DIASTOLIC BLOOD PRESSURE: 76 MMHG | OXYGEN SATURATION: 98 % | WEIGHT: 182 LBS | HEART RATE: 74 BPM | TEMPERATURE: 97.4 F

## 2021-02-16 PROCEDURE — 99072 ADDL SUPL MATRL&STAF TM PHE: CPT

## 2021-02-16 PROCEDURE — 99204 OFFICE O/P NEW MOD 45 MIN: CPT

## 2021-02-16 NOTE — ASSESSMENT
[FreeTextEntry1] : 60 year old female with history of multiple sub-cm nodules, osteopenia here for evaluation. \par \par Thyroid Nodules \par -Ultrasound reviewed with patient. \par -Multiple sub-cm nodules noted under < 1 cm with spongiform appearance\par -Not meeting FNA criteria at the time \par -Will follow up ultrasound in 18 months \par \par Osteopenia \par -L-spine: -1.8, Left neck: -0.1\par -Displaying stability in comparison to DEXA scan in 2018 \par -Continue Vitamin D and calcium supplementation \par -Weight bearing exercises are recommended \par -Follow up DEXA in 2-3 years \par \par \par -Follow up in 18 months for in office ultrasound

## 2021-02-16 NOTE — PHYSICAL EXAM
[Alert] : alert [Well Nourished] : well nourished [No Acute Distress] : no acute distress [Normal Sclera/Conjunctiva] : normal sclera/conjunctiva [EOMI] : extra ocular movement intact [PERRL] : pupils equal, round and reactive to light [Normal Outer Ear/Nose] : the ears and nose were normal in appearance [Normal Hearing] : hearing was normal [Normal TMs] : both tympanic membranes were normal [No Neck Mass] : no neck mass was observed [Thyroid Not Enlarged] : the thyroid was not enlarged [No Respiratory Distress] : no respiratory distress [Clear to Auscultation] : lungs were clear to auscultation bilaterally [Normal S1, S2] : normal S1 and S2 [Normal Rate] : heart rate was normal [Regular Rhythm] : with a regular rhythm [Normal Bowel Sounds] : normal bowel sounds [Not Tender] : non-tender [Soft] : abdomen soft [No CVA Tenderness] : no ~M costovertebral angle tenderness [Normal Gait] : normal gait [No Clubbing, Cyanosis] : no clubbing  or cyanosis of the fingernails [No Joint Swelling] : no joint swelling seen [Normal Strength/Tone] : muscle strength and tone were normal [No Rash] : no rash [No Skin Lesions] : no skin lesions [No Motor Deficits] : the motor exam was normal [Normal Reflexes] : deep tendon reflexes were 2+ and symmetric [No Tremors] : no tremors [Oriented x3] : oriented to person, place, and time [Normal Affect] : the affect was normal [Normal Insight/Judgement] : insight and judgment were intact [Normal Mood] : the mood was normal

## 2021-02-16 NOTE — HISTORY OF PRESENT ILLNESS
[FreeTextEntry1] : 60 year old female here for evaluation for thyroid nodules and osteopenia \par \par Thyroid Nodules: \par 15 years ago patient had a biopsy with Dr. Roa and it resulted as a cyst. \par Since then she was following up with Dr. Delvalle who had been monitoring her nodules \par \par Most recent ultrasound in 12/2020 \par Right Thyroid:  Upper pole nodule: 9 x 5 x 6 mm spongiform \par 6 x 3 x 4 mm with spongiform appearance \par 4 x 3 mm colloid nodule \par \par Left Lobe: Lower pole: 5 x 4 x 7 mm, spongiform nodule \par 5 x 4 mm spongiform nodule \par 9 x 6 mm spongiform nodule \par \par TSH of 1.63, history of thyroiditis in the past ( 20 years ago) \par Never been on LT4\par \par \par No compressive symptoms in the neck \par No family history of thyroid cancer \par No history of head or neck radiation exposure

## 2021-02-23 ENCOUNTER — TRANSCRIPTION ENCOUNTER (OUTPATIENT)
Age: 61
End: 2021-02-23

## 2021-02-24 ENCOUNTER — TRANSCRIPTION ENCOUNTER (OUTPATIENT)
Age: 61
End: 2021-02-24

## 2021-02-25 ENCOUNTER — NON-APPOINTMENT (OUTPATIENT)
Age: 61
End: 2021-02-25

## 2021-02-25 ENCOUNTER — TRANSCRIPTION ENCOUNTER (OUTPATIENT)
Age: 61
End: 2021-02-25

## 2021-03-01 ENCOUNTER — APPOINTMENT (OUTPATIENT)
Dept: NEUROLOGY | Facility: CLINIC | Age: 61
End: 2021-03-01
Payer: COMMERCIAL

## 2021-03-01 VITALS
SYSTOLIC BLOOD PRESSURE: 141 MMHG | WEIGHT: 180 LBS | HEIGHT: 64 IN | BODY MASS INDEX: 30.73 KG/M2 | DIASTOLIC BLOOD PRESSURE: 82 MMHG | HEART RATE: 71 BPM

## 2021-03-01 VITALS — TEMPERATURE: 96.4 F

## 2021-03-01 PROCEDURE — 99072 ADDL SUPL MATRL&STAF TM PHE: CPT

## 2021-03-01 PROCEDURE — 99213 OFFICE O/P EST LOW 20 MIN: CPT

## 2021-03-01 NOTE — HISTORY OF PRESENT ILLNESS
[FreeTextEntry1] : *** 03/01/2021  ***\par Ms. Rodrigez returns for follow-up of test results.  She reports that she had 1 near syncope spell, and no recurrent vertigo.  She had MRI brain, MRA head, MRV head that had some incidental findings, but showed no evidence of ischemic disease.  Ms. Rodrigez noted that she has had increasing tinnitus recently, and had seen Dr. Kyle Schultz last fall for this complaint.\par \par  *** 12/30/2020  ***\par Ms. RODRIGEZ had h/o syncope - 3 times in life - 1 occ immediately after giving blood, walking out of donation center, 1 follow-ing day after giving blood, went to shovel snow, most recently in Nov before election - had diarrhea that day, awoke in night to go to BR and awoke on floor (thought she probably sat down), had to go to BR again (diarrhea) and felt lightheaded and syncopized. \par Ms. RODRIGEZ also notes h/o frequent motion sickness, laying flat or sitting up quickly can trigger vertigo,  now noted that it can occur with rapid head movements. Vertigo will last a minute or so when it occurs. Vertigo will recurr several times per week.  \par \par Ms. RODRIGEZ has also seen ENT for tinnitus and had hearing test and was told she had borderline hearing loss. \par \par meds - prozac, asa 81, calcium, mvi\par all - sulfa, flagyl\par \par soc - no tobacco, social ETOH, works as school nurse,\par FH - no h/o neurological illnesses. F- skin CA, CAD/MI\par ROS - negative x 14 systems. - chronic tinnitus.\par \par

## 2021-03-01 NOTE — ASSESSMENT
[FreeTextEntry1] : Ms. Hooper presented with a history that is strongly suggestive of recurrent syncope. The history of vertigo was less clear. There is positional component, but the presence of chronic symptoms and absence of clear rotatory nystagmus on exam raise possibility of central cause.  Neuroimaging shows no evidence of ischemia or mass producing vertigo.  There is an incidental finding of a "high riding left jugular bulb and jugular diverticula extending superiorly to the left vestibular aqueduct, this finding can be associated with pulsatile tinnitus and third window phenomena" that may bear on the history of tinnitus.\par \par Plan \par 1.  No further neurological work-up required.  No evidence of ischemic disease or venous thrombosis.\par 2.  Message sent to Dr. Kyle Schultz regarding finding of jugular bulb, to determine whether it has any bearing on history of tinnitus.  Ms. Hooper will follow up with Dr. Schultz.\par 3.  Follow-up as needed.\par \par \par

## 2021-04-26 ENCOUNTER — APPOINTMENT (OUTPATIENT)
Dept: INTERNAL MEDICINE | Facility: CLINIC | Age: 61
End: 2021-04-26
Payer: COMMERCIAL

## 2021-04-26 VITALS
SYSTOLIC BLOOD PRESSURE: 140 MMHG | TEMPERATURE: 98.5 F | WEIGHT: 180 LBS | DIASTOLIC BLOOD PRESSURE: 76 MMHG | HEART RATE: 75 BPM | OXYGEN SATURATION: 98 % | HEIGHT: 64 IN | BODY MASS INDEX: 30.73 KG/M2

## 2021-04-26 VITALS
DIASTOLIC BLOOD PRESSURE: 70 MMHG | RESPIRATION RATE: 16 BRPM | BODY MASS INDEX: 29.87 KG/M2 | SYSTOLIC BLOOD PRESSURE: 130 MMHG | WEIGHT: 174 LBS | HEART RATE: 72 BPM

## 2021-04-26 DIAGNOSIS — R55 SYNCOPE AND COLLAPSE: ICD-10-CM

## 2021-04-26 DIAGNOSIS — Z87.898 PERSONAL HISTORY OF OTHER SPECIFIED CONDITIONS: ICD-10-CM

## 2021-04-26 PROCEDURE — 99072 ADDL SUPL MATRL&STAF TM PHE: CPT

## 2021-04-26 PROCEDURE — 99213 OFFICE O/P EST LOW 20 MIN: CPT

## 2021-04-26 NOTE — PHYSICAL EXAM
[Well-Appearing] : well-appearing [Normal Sclera/Conjunctiva] : normal sclera/conjunctiva [PERRL] : pupils equal round and reactive to light [EOMI] : extraocular movements intact [Normal Outer Ear/Nose] : the outer ears and nose were normal in appearance [Normal Oropharynx] : the oropharynx was normal [Normal Nasal Mucosa] : the nasal mucosa was normal [No Lymphadenopathy] : no lymphadenopathy [Supple] : supple [Thyroid Normal, No Nodules] : the thyroid was normal and there were no nodules present [No Respiratory Distress] : no respiratory distress  [Clear to Auscultation] : lungs were clear to auscultation bilaterally [Normal Rate] : normal rate  [Regular Rhythm] : with a regular rhythm [Normal S1, S2] : normal S1 and S2 [No Murmur] : no murmur heard [No Carotid Bruits] : no carotid bruits [Pedal Pulses Present] : the pedal pulses are present [No Edema] : there was no peripheral edema [Soft] : abdomen soft [Non Tender] : non-tender [No HSM] : no HSM [Normal Supraclavicular Nodes] : no supraclavicular lymphadenopathy [Normal Posterior Cervical Nodes] : no posterior cervical lymphadenopathy [Normal Anterior Cervical Nodes] : no anterior cervical lymphadenopathy [No CVA Tenderness] : no CVA  tenderness [No Spinal Tenderness] : no spinal tenderness [No Joint Swelling] : no joint swelling [Grossly Normal Strength/Tone] : grossly normal strength/tone [No Rash] : no rash [No Focal Deficits] : no focal deficits [Normal Gait] : normal gait [Normal Affect] : the affect was normal [Alert and Oriented x3] : oriented to person, place, and time [No Acute Distress] : no acute distress [de-identified] : scattered freckles

## 2021-04-26 NOTE — ASSESSMENT
[FreeTextEntry1] : \par 62 yo F pmhx HLD, hx syncope (hx w/u with echo showing MV thickening), hx thyroiditis, thyroid nodules, osteopenia, depression, obesity for f/u\par \par hx recurrent syncope (hx w/u with echo showing MV thickening), dizziness/vertigo (possible BPPV)- \par -reports hx syncope (x2 LOC)- last 11/20\par -hx near syncope (x2)- hx in 10/20 episode had onset after blood donation s/p ER eval thought vasovagal.  Last 2/21.\par -hx negative CT head 2014\par -11/20 echo: EF 65%, nl LV/RV fxn and size, min AR, nl MV\par -1/21 MRI head/MRA/venogram: mild volume loss, partially empty sella, microvascular disease.  No acute hemorrhage, restricted diffusion or shift.  No significant stenosis.  High riding left jugular bulb, this can be associated with pulsatile tinnitus.\par -followed by cardio, last seen 9/20- noted hx syncope and HLD and declined Rx for HLD. Repeat echo ordered- told nl since.  Reported since then, discussed with cardio 2/21 lipids and after reviewed with cardio, advised cont ASA and started on Crestor 10mg with plans for repeat lipids after 2mo.  F/U pending.\par -following with neuro, seen 12/20 with MRI/A ordered, done 1/21 and notified mainly age-related changes, no acute pathology.  Had f/u 3/21 with imaging discussed and no intervention advised except reported worsened tinnitus and awaiting ENT f/u- advised f/u re: ? jugular bulb findings on imaging have any bearing on tinnitus.  Advised to f/u prn.\par -on Crestor 10mg since 2/21, tolerating w/o SEs\par -advised to stay well hydrated\par -advised prompt medical eval if LOC recurs or new neurologic sx's arise\par \par HLD- 2/21 Tchol 230  (was 109)  (was 168) HDL 48; ASCV risk < 7.5\par -followed by cardio, last seen 9/20- noted hx syncope and HLD and declined Rx for HLD. Repeat echo ordered- told nl since.  Reported since then, discussed with cardio 2/21 lipids and after reviewed with cardio, advised cont ASA and started on Crestor 10mg with plans for repeat lipids after 2mo.  F/U pending.\par -on Crestor 10mg since 2/21, tolerating w/o SEs\par -low fat diet, exercise and wt loss advised\par -nutrition referral given- pending\par -check lipids, cmp - slip given per request\par \par hx tinnitus (high pitched and occasionally pulsatile),, hearing loss- stable\par -followed by ENT 9/20, told has mild hearing loss but no intervention advised.  F/U pending\par -following with neuro, seen 12/20 with MRI/A ordered, done 1/21 and notified mainly age-related changes, no acute pathology.  Had f/u 3/21 with imaging discussed and no intervention advised except reported worsened tinnitus and awaiting ENT f/u- advised f/u re: ? jugular bulb findings on imaging have any bearing on tinnitus.  Advised to f/u prn.\par \par osteopenia, hx thyroid nodules-\par -8/20 TSH wnl\par -8/20 vit d wnl\par -12/20 DEXA osteopenia\par -12/20 thyroid US: reports last done 2010, not 8/20 as per report)- will forward prior endo/US records\par multiple thyroid nodules: on right [id number], [id number] and 4x3mm colloid follicle; on left: [id number], 5x4mm\par -followed by endo, seen 2/21 with US thyroid reviewed, advised f/u in 18 mo with repeat US then planned.  Advised ca+D qd per pt.\par -CA/D, exercise encouraged\par \par depression- mildly worsened recently 2/2 covid related work stress; denies HI/SI or panic attacks\par -followed by psychiatry, has f/u 5/21\par -on fluoxetine 50mg (increased 4/21)\par -doing therapy (SW) weekly, feels is help\par \par hx obesity - now overweight, congratulated!\par -healthy eating, wt loss encouraged\par -nutrition referral given prior- pending\par \par MISC:  Continued social distancing and measure for covid19 prevention encouraged.  \par -declined check covid19 AB screen.\par -hx negative covid pcr pre -colonoscopy in 8/20- told negative\par -hx covid vaccine series (moderna)- finished 2/5/21\par \par \par HCM\par -hx CPE 8/20 by prior PMD, yearly advised\par -8/20 cbc/cmp/TSH/A1c/vit d wnl\par -hx negative hep C screening 8/11\par -hx flu shot 10/20\par -hx Tdap 12/20\par -hx shingles vaccine 7/19\par -4/13 labs: immune to MMR/Varicella\par -hx negative PAP 8/19 by GYN, Dr. Gee - has f/u 7/21\par -hx negative mammo/US 12/20\par -hx screening colonoscopy 8/20: no polyps (hx polyps) rec: 5 yrs per pt.  Asked to forward record.\par -followed by derm, hx eval 8/20- yearly skin screening advised.  Regular use of sun block for skin cancer prevention advised.\par -yearly optho screening advised, hx eval 2020\par \par Pt declines appt today, states will call back to schedule.\par \par

## 2021-04-26 NOTE — REVIEW OF SYSTEMS
[Negative] : Integumentary [FreeTextEntry4] : see HPI [de-identified] : see HPI [de-identified] : see HPI

## 2021-04-26 NOTE — HISTORY OF PRESENT ILLNESS
[FreeTextEntry1] : f/u [de-identified] : \par 62 yo F pmhx HLD, hx syncope (hx w/u with echo showing MV thickening), hx thyroiditis, thyroid nodules, osteopenia, depression, obesity for f/u\par \par Last seen in office in 2/21 for f/u.\par \par Feeling well.\par Does not need med refills.\par Not fasting- wants lab slip\par \par Reports is socially distancing and using precautions for covid prevention. 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  Notes has eaten a meal 2 hrs prior.  Denies recurrence since\par hx chronic hx dizziness- mainly room spinning sensation on/off x many yrs\par -feels onset is mainly with laying flat or with rapid head movements\par -denies HA, vision issues, dysarthria, focal weakness or ear sx's with it\par -followed by cardio, last seen 9/20- noted hx syncope and HLD and declined Rx for HLD. Repeat echo ordered- told nl since.  Reported since then, discussed with cardio 2/21 lipids and after reviewed with cardio, advised cont ASA and started on Crestor 10mg with plans for repeat lipids after 2mo.  F/U pending.\par -following with neuro, seen 12/20 with MRI/A ordered, done 1/21 and notified mainly age-related changes, no acute pathology.  Had f/u 3/21 with imaging discussed and no intervention advised except reported worsened tinnitus and awaiting ENT f/u- advised f/u re: ? jugular bulb findings on imaging have any bearing on tinnitus.  Advised to f/u prn.\par \par HLD, hx syncope- \par -followed by cardio, last seen 9/20- noted hx syncope and HLD and declined Rx for HLD. Repeat echo ordered- told nl since.  Reported since then, discussed with cardio 2/21 lipids and after reviewed with cardio, advised cont ASA and started on Crestor 10mg with plans for repeat lipids after 2mo.  F/U pending.\par -on Crestor 10 since 2/21, reports adherent and tolerating w/o SEs\par -reports not adherent with low fat diet\par -exercise: walks daily x 30 mins, also aerobic home video 2-3x/wk x 45 min - all done w/o sx's or limitations\par \par hx tinnitus (high pitched and occasionally pulsatile), hearing loss- reports is stable\par -seen by ENT 9/20, told has mild hearing loss but no intervention advised.  F/U pending.\par \par osteopenia, hx thyroid nodules-\par -hx DEXA 12/20\par -on CA/D\par -exercises\par -hx thyroid US 12/20\par -followed by endo, seen 2/21 with US thyroid reviewed, advised f/u in 18 mo with repeat US then planned. Advised ca+D qd per pt.\par \par depression- feels mildly worsened recently 2/2 covid related stress at work, notes just found out daughter will be getting engaged soon- happy with the news; denies HI/SI or panic attacks\par -on fluoxetine 50mg (increased 4/21) by psychiatry - has f/u 5/21\par -doing therapy (SW) weekly, feels is help\par -sleeping well, has good appetite\par \par Denies  complaints.

## 2021-04-30 LAB
ALBUMIN SERPL ELPH-MCNC: 4.3 G/DL
ALP BLD-CCNC: 67 U/L
ALT SERPL-CCNC: 41 U/L
ANION GAP SERPL CALC-SCNC: 11 MMOL/L
AST SERPL-CCNC: 35 U/L
BILIRUB SERPL-MCNC: 0.5 MG/DL
BUN SERPL-MCNC: 20 MG/DL
CALCIUM SERPL-MCNC: 9.7 MG/DL
CHLORIDE SERPL-SCNC: 105 MMOL/L
CHOLEST SERPL-MCNC: 154 MG/DL
CO2 SERPL-SCNC: 24 MMOL/L
CREAT SERPL-MCNC: 0.83 MG/DL
GLUCOSE SERPL-MCNC: 90 MG/DL
HDLC SERPL-MCNC: 49 MG/DL
LDLC SERPL CALC-MCNC: 93 MG/DL
NONHDLC SERPL-MCNC: 105 MG/DL
POTASSIUM SERPL-SCNC: 4.8 MMOL/L
PROT SERPL-MCNC: 6.6 G/DL
SODIUM SERPL-SCNC: 139 MMOL/L
TRIGL SERPL-MCNC: 59 MG/DL

## 2021-07-14 ENCOUNTER — RESULT REVIEW (OUTPATIENT)
Age: 61
End: 2021-07-14

## 2021-08-17 ENCOUNTER — RX RENEWAL (OUTPATIENT)
Age: 61
End: 2021-08-17

## 2021-08-23 ENCOUNTER — APPOINTMENT (OUTPATIENT)
Dept: INTERNAL MEDICINE | Facility: CLINIC | Age: 61
End: 2021-08-23
Payer: COMMERCIAL

## 2021-08-23 ENCOUNTER — APPOINTMENT (OUTPATIENT)
Dept: INTERNAL MEDICINE | Facility: CLINIC | Age: 61
End: 2021-08-23

## 2021-08-23 VITALS
HEART RATE: 75 BPM | BODY MASS INDEX: 30.05 KG/M2 | TEMPERATURE: 99.1 F | OXYGEN SATURATION: 98 % | RESPIRATION RATE: 16 BRPM | WEIGHT: 176 LBS | HEIGHT: 64 IN | DIASTOLIC BLOOD PRESSURE: 80 MMHG | SYSTOLIC BLOOD PRESSURE: 138 MMHG

## 2021-08-23 DIAGNOSIS — Z87.898 PERSONAL HISTORY OF OTHER SPECIFIED CONDITIONS: ICD-10-CM

## 2021-08-23 PROCEDURE — G0447 BEHAVIOR COUNSEL OBESITY 15M: CPT | Mod: NC

## 2021-08-23 PROCEDURE — 99396 PREV VISIT EST AGE 40-64: CPT | Mod: 25

## 2021-08-23 PROCEDURE — 96127 BRIEF EMOTIONAL/BEHAV ASSMT: CPT

## 2021-08-23 PROCEDURE — 36415 COLL VENOUS BLD VENIPUNCTURE: CPT

## 2021-08-23 NOTE — ASSESSMENT
[FreeTextEntry1] : \par 60 yo F pmhx HLD, hx syncope (hx w/u with echo showing MV thickening), hx thyroiditis, thyroid nodules, osteopenia, depression, obesity for CPE\par \par \par hx recurrent syncope (hx w/u with echo showing MV thickening), dizziness/vertigo (possible BPPV)- \par -reports hx syncope (x2 LOC)- last 11/20\par -hx near syncope (x2)- hx in 10/20 episode had onset after blood donation s/p ER eval thought vasovagal.  Last 2/21.\par -hx negative CT head 2014\par -11/20 echo: EF 65%, nl LV/RV fxn and size, min AR, nl MV\par -1/21 MRI head/MRA/venogram: mild volume loss, partially empty sella, microvascular disease.  No acute hemorrhage, restricted diffusion or shift.  No significant stenosis.  High riding left jugular bulb, this can be associated with pulsatile tinnitus.\par -followed by cardio, last seen 9/20- noted hx syncope and HLD and declined Rx for HLD. Repeat echo ordered- told nl since.  Reported since then, discussed with cardio 2/21 lipids and after reviewed with cardio, advised cont ASA and started on Crestor 10mg with plans for repeat lipids after 2mo.  F/U pending.\par -following with neuro, seen 12/20 with MRI/A ordered, done 1/21 and notified mainly age-related changes, no acute pathology.  Had f/u 3/21 with imaging discussed and no intervention advised except reported worsened tinnitus and awaiting ENT f/u- advised f/u re: ? jugular bulb findings on imaging have any bearing on tinnitus.  Advised to f/u prn.\par -on Crestor 10 mg since 2/21, tolerating w/o SEs\par -advised to stay well hydrated\par -advised prompt medical eval if LOC recurs or new neurologic sx's arise\par \par HLD- better, 4/21 Tchol 154 TG 59 LDL 93 HDL 49\par -followed by cardio, last seen 9/20- noted hx syncope and HLD and declined Rx for HLD. Repeat echo ordered- told nl since.  Reported since then, discussed with cardio 2/21 lipids and after reviewed with cardio, advised cont ASA and started on Crestor 10mg with plans for repeat lipids after 2mo.  F/U pending.\par -on Crestor 10 mg since 2/21, tolerating w/o SEs\par -low fat diet, exercise and wt loss advised\par -nutrition referral given- pending\par -check lipids, cmp\par \par hx tinnitus (high pitched and occasionally pulsatile),, hearing loss- stable\par -followed by ENT 9/20, told has mild hearing loss but no intervention advised.  F/U pending\par -following with neuro, seen 12/20 with MRI/A ordered, done 1/21 and notified mainly age-related changes, no acute pathology.  Had f/u 3/21 with imaging discussed and no intervention advised except reported worsened tinnitus and awaiting ENT f/u- advised f/u re: ? jugular bulb findings on imaging have any bearing on tinnitus.  Advised to f/u prn.\par \par osteopenia, hx thyroid nodules-\par -8/20 TSH wnl\par -8/20 vit d wnl\par -12/20 DEXA osteopenia\par -12/20 thyroid US: reports last done 2010, not 8/20 as per report)- will forward prior endo/US records\par multiple thyroid nodules: on right [id number], [id number] and 4x3mm colloid follicle; on left: [id number], 5x4mm\par -followed by endo, seen 2/21 with US thyroid reviewed, advised f/u in 18 mo with repeat US then planned.  Advised ca+D qd per pt.\par -CA/D, exercise encouraged\par \par depression- reports stable; denies HI/SI or panic attacks\par -followed by psychiatry, had f/u 7/21\par -on fluoxetine 50 mg (increased 4/21)\par -doing therapy (SW) weekly, feels is help\par \par obesity- BMI 30\par -risks of obesity discussed\par -benefits of weight loss discussed\par -low fat, low cholesterol, low carbohydrate diet advised\par -smaller portion sizes encouraged\par -advised avoidance of sugary drinks\par -aerobic exercise encouraged\par -weight loss advised\par -declines nutrition referral\par -counseling time 15 minutes\par \par \par MISC:  Continued social distancing and measure for covid19 prevention encouraged.  \par -hx covid vaccine series (moderna)- 1/7, 2/4/21\par \par \par HCM\par -check screening labs; declines HIV/STD screening\par -hx negative hep C screening 8/11\par -hx flu shot 10/20\par -hx Tdap 12/20\par -hx shingles vaccine 7/19\par -4/13 labs: immune to MMR/Varicella\par -hx negative PAP 7/21 by GYN, Dr. Gee\par -hx negative mammo/US 12/20\par -hx screening colonoscopy 8/20: no polyps (hx polyps) rec: 5 yrs per pt.  Asked to forward record.\par -followed by derm, hx eval 8/20- yearly skin screening advised.  Regular use of sun block for skin cancer prevention advised.\par -yearly optho screening advised, hx eval 2020\par -yearly dental screening advised\par -advised to designate HCP and provide copy of completed form for records\par \par Pt's cell: [phone]\par Pt declines f/u appt, advised f/u in 6mo and yearly CPE.  Advised to f/u sooner as needed.\par \par Labs drawn in office today.\par

## 2021-08-23 NOTE — HEALTH RISK ASSESSMENT
[HIV test declined] : HIV test declined [Hepatitis C test declined] : Hepatitis C test declined [No falls in past year] : Patient reported no falls in the past year [PHQ-2 Negative - No further assessment needed] : PHQ-2 Negative - No further assessment needed [[id number]] : 0 [Guns at Home] : no guns at home [MammogramDate] : 12/20 [PapSmearDate] : 07/21 [BoneDensityDate] : 12/20 [BoneDensityComments] : osteopenia [ColonoscopyDate] : 08/20 [ColonoscopyComments] : no polyps (hx polyps) rec: 5 yrs per pt [HIVDate] : 08/11 [HIVComments] : negative [HepatitisCDate] : 12/16 [HepatitisCComments] : negative [AdvancecareDate] : 08/21

## 2021-08-23 NOTE — HISTORY OF PRESENT ILLNESS
[FreeTextEntry1] : \par CPE [Binge Drinking] : denies binge drinking [Patient Concern] : no personal concern about alcohol use [Family Concern] : no family concern about alcohol use [Vision Problems] : She denies vision problems [de-identified] : 8/20, Dr. Carlos [de-identified] : hx flu shot 10/20, hx Tdap 12/20, hx shingles vaccine 7/19 [de-identified] : \par 62 yo F pmhx HLD, hx syncope (hx w/u with echo showing MV thickening), hx thyroiditis, thyroid nodules, osteopenia, depression, obesity for CPE\par \par Last seen in office in 4/21 for f/u.\par \par Feeling well.\par Does not need med refills.\par Fasting for labs.\par \par Reports is socially distancing and using precautions for covid prevention. 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  Notes has eaten a meal 2 hrs prior.  Denies recurrence since\par hx chronic hx dizziness- mainly room spinning sensation on/off x many yrs\par -feels onset is mainly with laying flat or with rapid head movements\par -denies HA, vision issues, dysarthria, focal weakness or ear sx's with it\par -followed by cardio, last seen 9/20- noted hx syncope and HLD and declined Rx for HLD. Repeat echo ordered- told nl since.  Reported since then, discussed with cardio 2/21 lipids and after reviewed with cardio, advised cont ASA and started on Crestor 10mg with plans for repeat lipids after 2mo.  F/U pending.\par -following with neuro, seen 12/20 with MRI/A ordered, done 1/21 and notified mainly age-related changes, no acute pathology.  Had f/u 3/21 with imaging discussed and no intervention advised except reported worsened tinnitus and awaiting ENT f/u- advised f/u re: ? jugular bulb findings on imaging have any bearing on tinnitus.  Advised to f/u prn.\par \par HLD, hx syncope- \par -followed by cardio, last seen 9/20- noted hx syncope and HLD and declined Rx for HLD. Repeat echo ordered- told nl since.  Reported since then, discussed with cardio 2/21 lipids and after reviewed with cardio, advised cont ASA and started on Crestor 10mg with plans for repeat lipids after 2mo.  F/U pending.\par -on Crestor 10 since 2/21, reports adherent and tolerating w/o SEs\par -reports not adherent with low fat diet\par -exercise: walks daily x 30 mins, also aerobic home video 2-3x/wk x 45 min - all done w/o sx's or limitations\par \par hx tinnitus (high pitched and occasionally pulsatile), hearing loss- reports is stable\par -seen by ENT 9/20, told has mild hearing loss but no intervention advised.  F/U pending.\par \par osteopenia, hx thyroid nodules-\par -hx DEXA 12/20\par -on CA/D\par -exercises\par -hx thyroid US 12/20\par -followed by endo, seen 2/21 with US thyroid reviewed, advised f/u in 18 mo with repeat US then planned. Advised ca+D qd per pt.\par \par depression- reports overall stable, notes mild recent stress as moving into new home and living in hotel temporarily while paperwork goes through; denies HI/SI or panic attacks\par -on fluoxetine 50 mg (increased 4/21) by psychiatry - had f/u 7/21\par -doing therapy (SW) weekly, feels is help\par -sleeping well, has good appetite\par \par Denies  complaints.

## 2021-08-25 ENCOUNTER — NON-APPOINTMENT (OUTPATIENT)
Age: 61
End: 2021-08-25

## 2021-08-25 LAB
25(OH)D3 SERPL-MCNC: 45.3 NG/ML
ALBUMIN SERPL ELPH-MCNC: 4.5 G/DL
ALP BLD-CCNC: 65 U/L
ALT SERPL-CCNC: 41 U/L
ANION GAP SERPL CALC-SCNC: 12 MMOL/L
APPEARANCE: CLEAR
AST SERPL-CCNC: 45 U/L
BACTERIA: NEGATIVE
BASOPHILS # BLD AUTO: 0.05 K/UL
BASOPHILS NFR BLD AUTO: 0.8 %
BILIRUB SERPL-MCNC: 0.6 MG/DL
BILIRUBIN URINE: NEGATIVE
BLOOD URINE: ABNORMAL
BUN SERPL-MCNC: 21 MG/DL
CALCIUM SERPL-MCNC: 9.4 MG/DL
CHLORIDE SERPL-SCNC: 103 MMOL/L
CHOLEST SERPL-MCNC: 149 MG/DL
CO2 SERPL-SCNC: 25 MMOL/L
COLOR: NORMAL
CREAT SERPL-MCNC: 0.84 MG/DL
CREAT SPEC-SCNC: 125 MG/DL
CREAT/PROT UR: 0.1 RATIO
EOSINOPHIL # BLD AUTO: 0.11 K/UL
EOSINOPHIL NFR BLD AUTO: 1.7 %
ESTIMATED AVERAGE GLUCOSE: 100 MG/DL
GLUCOSE QUALITATIVE U: NEGATIVE
GLUCOSE SERPL-MCNC: 99 MG/DL
HBA1C MFR BLD HPLC: 5.1 %
HCT VFR BLD CALC: 42.1 %
HDLC SERPL-MCNC: 53 MG/DL
HGB BLD-MCNC: 13.7 G/DL
HYALINE CASTS: 0 /LPF
IMM GRANULOCYTES NFR BLD AUTO: 0.2 %
KETONES URINE: NEGATIVE
LDLC SERPL CALC-MCNC: 81 MG/DL
LEUKOCYTE ESTERASE URINE: NEGATIVE
LYMPHOCYTES # BLD AUTO: 1.53 K/UL
LYMPHOCYTES NFR BLD AUTO: 23.7 %
MAN DIFF?: NORMAL
MCHC RBC-ENTMCNC: 29 PG
MCHC RBC-ENTMCNC: 32.5 GM/DL
MCV RBC AUTO: 89.2 FL
MICROSCOPIC-UA: NORMAL
MONOCYTES # BLD AUTO: 0.34 K/UL
MONOCYTES NFR BLD AUTO: 5.3 %
NEUTROPHILS # BLD AUTO: 4.41 K/UL
NEUTROPHILS NFR BLD AUTO: 68.3 %
NITRITE URINE: NEGATIVE
NONHDLC SERPL-MCNC: 97 MG/DL
PH URINE: 6
PLATELET # BLD AUTO: 328 K/UL
POTASSIUM SERPL-SCNC: 4.7 MMOL/L
PROT SERPL-MCNC: 6.7 G/DL
PROT UR-MCNC: 6 MG/DL
PROTEIN URINE: NORMAL
RBC # BLD: 4.72 M/UL
RBC # FLD: 12.9 %
RED BLOOD CELLS URINE: 1 /HPF
SODIUM SERPL-SCNC: 140 MMOL/L
SPECIFIC GRAVITY URINE: 1.02
SQUAMOUS EPITHELIAL CELLS: 1 /HPF
TRIGL SERPL-MCNC: 80 MG/DL
TSH SERPL-ACNC: 1.46 UIU/ML
UROBILINOGEN URINE: NORMAL
WBC # FLD AUTO: 6.45 K/UL
WHITE BLOOD CELLS URINE: 1 /HPF

## 2021-10-11 LAB
ALBUMIN SERPL ELPH-MCNC: 4.3 G/DL
ALP BLD-CCNC: 71 U/L
ALT SERPL-CCNC: 32 U/L
AST SERPL-CCNC: 38 U/L
BILIRUB DIRECT SERPL-MCNC: 0.2 MG/DL
BILIRUB INDIRECT SERPL-MCNC: 0.4 MG/DL
BILIRUB SERPL-MCNC: 0.6 MG/DL
PROT SERPL-MCNC: 6.5 G/DL

## 2021-12-08 ENCOUNTER — TRANSCRIPTION ENCOUNTER (OUTPATIENT)
Age: 61
End: 2021-12-08

## 2021-12-20 ENCOUNTER — APPOINTMENT (OUTPATIENT)
Dept: INTERNAL MEDICINE | Facility: CLINIC | Age: 61
End: 2021-12-20

## 2021-12-20 DIAGNOSIS — R80.9 PROTEINURIA, UNSPECIFIED: ICD-10-CM

## 2022-02-22 ENCOUNTER — APPOINTMENT (OUTPATIENT)
Dept: INTERNAL MEDICINE | Facility: CLINIC | Age: 62
End: 2022-02-22
Payer: COMMERCIAL

## 2022-02-22 VITALS
OXYGEN SATURATION: 98 % | HEART RATE: 67 BPM | WEIGHT: 182 LBS | BODY MASS INDEX: 31.07 KG/M2 | DIASTOLIC BLOOD PRESSURE: 78 MMHG | TEMPERATURE: 98.5 F | HEIGHT: 64 IN | SYSTOLIC BLOOD PRESSURE: 136 MMHG

## 2022-02-22 VITALS — RESPIRATION RATE: 16 BRPM

## 2022-02-22 DIAGNOSIS — M25.562 PAIN IN RIGHT KNEE: ICD-10-CM

## 2022-02-22 DIAGNOSIS — M25.561 PAIN IN RIGHT KNEE: ICD-10-CM

## 2022-02-22 PROCEDURE — 36415 COLL VENOUS BLD VENIPUNCTURE: CPT

## 2022-02-22 PROCEDURE — 99213 OFFICE O/P EST LOW 20 MIN: CPT | Mod: 25

## 2022-02-22 NOTE — HISTORY OF PRESENT ILLNESS
[FreeTextEntry1] : \par f/u\par  [de-identified] : \par 60 yo F pmhx HLD, hx syncope (hx w/u with echo showing MV thickening), hx thyroiditis, thyroid nodules, osteopenia, depression, obesity for f/u\par \par Last seen in office in 8/21 for CPE with labs done.\par \par Feeling well.\par Need med refills.\par Fasting for labs.\par \par Reports is socially distancing and using precautions for covid prevention. 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  Applying heat/massage helping.  Concerned of statin relation- but does not want to stop taking Rx or change to another.  Interested to see ortho\par +knee stiffness, resolved after 1-2 hrs of movement in mornings\par -denies swelling, redness, paresthesias, focal weakness, falls, muscle aching\par -hx remote knee pain that self resolved, alos occasional hand joint pains- told arthritis.  Denies any other joint issues.\par \par hx recurrent syncope (hx w/u with echo showing MV thickening), dizziness/vertigo (possible BPPV)- no recent issues\par -last syncope 11/20\par -last near syncope 2/21 "felt "could have passed out" while was walking in snow after shoveling ~ 10 mins, states felt sweaty and generalized weakness so sat down and felt better after 10 mins. Notes has eaten a meal 2 hrs prior. Denies recurrence since\par hx chronic hx dizziness- mainly room spinning sensation on/off x many yrs\par -feels onset is mainly with laying flat or with rapid head movements\par -denies HA, vision issues, dysarthria, focal weakness or ear sx's with it\par -followed by cardio, last seen 9/20- noted hx syncope and HLD and declined Rx for HLD. Repeat echo ordered- told nl since. Reported since then, discussed with cardio 2/21 lipids and after reviewed with cardio, advised cont ASA and started on Crestor 10 mg with plans for repeat lipids after 2 mo. F/U pending.\par -following with neuro, seen 12/20 with MRI/A ordered, done 1/21 and notified mainly age-related changes, no acute pathology. Had f/u 3/21 with imaging discussed and no intervention advised except reported worsened tinnitus and awaiting ENT f/u- advised f/u re: ? jugular bulb findings on imaging have any bearing on tinnitus. Advised to f/u prn.\par \par HLD, hx syncope- \par -followed by cardio, last seen 9/20- noted hx syncope and HLD and declined Rx for HLD. Repeat echo ordered- told nl since. Reported since then, discussed with cardio 2/21 lipids and after reviewed with cardio, advised cont ASA and started on Crestor 10mg with plans for repeat lipids after 2mo. F/U pending.\par -on Crestor 10 since 2/21\par -reports not adherent with low fat diet\par -exercise: walks daily x 30 - 45 mins, also aerobic home video 2x/wk x 45 min - all done w/o sx's or limitations\par \par hx tinnitus (high pitched and occasionally pulsatile), hearing loss- reports is stable\par -seen by ENT 9/20, told has mild hearing loss but no intervention advised. F/U pending.\par \par osteopenia, hx thyroid nodules-\par -hx DEXA 12/20\par -on CA/D\par -exercises\par -hx thyroid US 12/20\par -followed by endo, seen 2/21 with US thyroid reviewed, advised f/u in 18 mo with repeat US then planned. Advised ca+D qd per pt.\par \par depression- reports overall stable, notes mild recent stress as moving into new home and living in hotel temporarily while paperwork goes through; denies HI/SI or panic attacks\par -on fluoxetine 60 mg by psychiatry - had f/u 7/21\par -doing therapy (SW) weekly, feels is help\par -sleeping well, has good appetite\par \par \par Denies  complaints. \par

## 2022-02-22 NOTE — ASSESSMENT
[FreeTextEntry1] : \par 62 yo F pmhx HLD, hx syncope (hx w/u with echo showing MV thickening), hx thyroiditis, thyroid nodules, osteopenia, depression, obesity for f/u\par \par joint pains (b/l knees, elbows)- ? OA,+ medical epicondylitis on exam, ? contribution of statin\par -declines to d/c statin and monitor for sx resolution\par -check labs: BRANDON, CCP AB, RF, ESR/CRP, CPK\par -willing to check xray knees, ordered\par -ortho referral given, info for fast track appt scheduling assistance given\par -advised heat, Tylenol prn and avoid excessive joint use\par -advised to f/u if sx's worsen or new sx's arise\par \par hx recurrent syncope (hx w/u with echo showing MV thickening), dizziness/vertigo (possible BPPV)- \par -reports hx syncope (x2 LOC)- last 11/20\par -hx near syncope (x2)- hx in 10/20 episode had onset after blood donation s/p ER eval thought vasovagal. Last 2/21.\par -hx negative CT head 2014\par -11/20 echo: EF 65%, nl LV/RV fxn and size, min AR, nl MV\par -1/21 MRI head/MRA/venogram: mild volume loss, partially empty sella, microvascular disease. No acute hemorrhage, restricted diffusion or shift. No significant stenosis. High riding left jugular bulb, this can be associated with pulsatile tinnitus.\par -followed by cardio, last seen 9/20- noted hx syncope and HLD and declined Rx for HLD. Repeat echo ordered- told nl since. Reported since then, discussed with cardio 2/21 lipids and after reviewed with cardio, advised cont ASA and started on Crestor 10mg with plans for repeat lipids after 2mo. F/U pending.\par -following with neuro, seen 12/20 with MRI/A ordered, done 1/21 and notified mainly age-related changes, no acute pathology. Had f/u 3/21 with imaging discussed and no intervention advised except reported worsened tinnitus and awaiting ENT f/u- advised f/u re: ? jugular bulb findings on imaging have any bearing on tinnitus. Advised to f/u prn.\par -on Crestor 10 mg since 2/21\par -advised to stay well hydrated\par -advised prompt medical eval if LOC recurs or new neurologic sx's arise\par \par HLD- 8/21 Tchol 149 TG 80 LDL 81 HDL 53; 10/21 LFTs wnl\par -followed by cardio, last seen 9/20- noted hx syncope and HLD and declined Rx for HLD. Repeat echo ordered- told nl since. Reported since then, discussed with cardio 2/21 lipids and after reviewed with cardio, advised cont ASA and started on Crestor 10mg with plans for repeat lipids after 2 mo. F/U pending.\par -on Crestor 10 mg since 2/21\par -low fat diet, exercise and wt loss advised\par -nutrition referral given- pending\par -check lipids, cmp\par \par hx tinnitus (high pitched and occasionally pulsatile), hearing loss- stable\par -followed by ENT 9/20, told has mild hearing loss but no intervention advised. F/U pending\par -following with neuro, seen 12/20 with MRI/A ordered, done 1/21 and notified mainly age-related changes, no acute pathology. Had f/u 3/21 with imaging discussed and no intervention advised except reported worsened tinnitus and awaiting ENT f/u- advised f/u re: ? jugular bulb findings on imaging have any bearing on tinnitus. Advised to f/u prn.\par \par osteopenia, hx thyroid nodules-\par -8/21 TSH/vit d wnl\par -12/20 DEXA osteopenia\par -12/20 thyroid US: reports last done 2010, not 8/20 as per report)- will forward prior endo/US records\par multiple thyroid nodules: on right [id number], [id number] and 4x3mm colloid follicle; on left: 2p6e3pl, 5x4mm\par -followed by endo, seen 2/21 with US thyroid reviewed, advised f/u in 18 mo with repeat US then planned. Advised ca+D qd per pt.\par -CA/D, exercise encouraged\par \par depression- reports stable; denies HI/SI or panic attacks\par -followed by psychiatry\par -on fluoxetine 60 mg (increased 4/21)\par -doing therapy (SW) weekly, feels is help\par \par obesity- BMI 31\par -risks of obesity discussed\par -benefits of weight loss discussed\par -low fat, low cholesterol, low carbohydrate diet advised\par -smaller portion sizes encouraged\par -advised avoidance of sugary drinks\par -aerobic exercise encouraged\par -weight loss advised\par -declines nutrition referral\par -counseling time 15 minutes\par \par \par MISC: Continued social distancing and measure for covid19 prevention encouraged. \par -hx covid vaccine series (moderna)- 1/7, 2/4/21; hx booster 11/21\par \par \par HCM\par -hx CPE 8/21, yearly advised\par -8/21 cbc/cmp/TSH/vit d wnl; UA trace prt --> prt/crt wnl\par -hx negative hep C screening 8/11\par -hx flu shot 10/21\par -hx Tdap 12/20\par -hx shingles vaccine 7/19\par -4/13 labs: immune to MMR/Varicella\par -hx negative PAP 7/21 by GYN, Dr. Gee\par -hx negative mammo/US 12/20, Rx's\par -hx screening colonoscopy 8/20: no polyps (hx polyps) rec: 5 yrs per pt. Asked to forward record.\par -followed by derm, hx eval 8/20- yearly skin screening advised. Regular use of sun block for skin cancer prevention advised.\par -yearly optho screening advised, hx eval 2020\par -advised to designate HCP and provide copy of completed form for records\par \par Pt's cell: [phone]\par Pt declines f/u sooner than next CPE 8/22- advised to f/u sooner as needed.\par \par Labs drawn in office today.\par \par

## 2022-02-22 NOTE — PHYSICAL EXAM
[No Acute Distress] : no acute distress [Well-Appearing] : well-appearing [Normal Sclera/Conjunctiva] : normal sclera/conjunctiva [PERRL] : pupils equal round and reactive to light [EOMI] : extraocular movements intact [Normal Outer Ear/Nose] : the outer ears and nose were normal in appearance [Normal Oropharynx] : the oropharynx was normal [Normal Nasal Mucosa] : the nasal mucosa was normal [No Lymphadenopathy] : no lymphadenopathy [Supple] : supple [Thyroid Normal, No Nodules] : the thyroid was normal and there were no nodules present [No Respiratory Distress] : no respiratory distress  [Clear to Auscultation] : lungs were clear to auscultation bilaterally [Normal Rate] : normal rate  [Regular Rhythm] : with a regular rhythm [Normal S1, S2] : normal S1 and S2 [No Murmur] : no murmur heard [No Carotid Bruits] : no carotid bruits [Pedal Pulses Present] : the pedal pulses are present [No Edema] : there was no peripheral edema [Soft] : abdomen soft [Non Tender] : non-tender [No HSM] : no HSM [Normal Supraclavicular Nodes] : no supraclavicular lymphadenopathy [Normal Posterior Cervical Nodes] : no posterior cervical lymphadenopathy [Normal Anterior Cervical Nodes] : no anterior cervical lymphadenopathy [No CVA Tenderness] : no CVA  tenderness [No Spinal Tenderness] : no spinal tenderness [No Joint Swelling] : no joint swelling [Grossly Normal Strength/Tone] : grossly normal strength/tone [No Rash] : no rash [No Focal Deficits] : no focal deficits [Normal Gait] : normal gait [Normal Affect] : the affect was normal [Alert and Oriented x3] : oriented to person, place, and time [de-identified] : b/l knees: FROM w/o pain, +mild crepitus, no focal swelling/tenderness  b/l elbows:min focal tenderness at medical epicondyle (L>R), no swelling/redeness [de-identified] : scattered freckles

## 2022-02-22 NOTE — REVIEW OF SYSTEMS
[Negative] : Integumentary [FreeTextEntry4] : see HPI [FreeTextEntry9] : see HPI [de-identified] : see HPI [de-identified] : see HPI

## 2022-03-01 LAB
ALBUMIN SERPL ELPH-MCNC: 4.6 G/DL
ALP BLD-CCNC: 75 U/L
ALT SERPL-CCNC: 39 U/L
ANACR T: NEGATIVE
ANION GAP SERPL CALC-SCNC: 14 MMOL/L
AST SERPL-CCNC: 26 U/L
BILIRUB SERPL-MCNC: 0.5 MG/DL
BUN SERPL-MCNC: 24 MG/DL
CALCIUM SERPL-MCNC: 9.8 MG/DL
CCP AB SER IA-ACNC: <8 UNITS
CHLORIDE SERPL-SCNC: 102 MMOL/L
CHOLEST SERPL-MCNC: 203 MG/DL
CK SERPL-CCNC: 189 U/L
CO2 SERPL-SCNC: 25 MMOL/L
CREAT SERPL-MCNC: 0.85 MG/DL
CRP SERPL-MCNC: <3 MG/L
ERYTHROCYTE [SEDIMENTATION RATE] IN BLOOD BY WESTERGREN METHOD: 10 MM/HR
GLUCOSE SERPL-MCNC: 98 MG/DL
HDLC SERPL-MCNC: 53 MG/DL
LDLC SERPL CALC-MCNC: 127 MG/DL
NONHDLC SERPL-MCNC: 150 MG/DL
POTASSIUM SERPL-SCNC: 4.9 MMOL/L
PROT SERPL-MCNC: 6.8 G/DL
RF+CCP IGG SER-IMP: NEGATIVE
RHEUMATOID FACT SER QL: <10 IU/ML
SODIUM SERPL-SCNC: 140 MMOL/L
TRIGL SERPL-MCNC: 118 MG/DL

## 2022-03-18 ENCOUNTER — TRANSCRIPTION ENCOUNTER (OUTPATIENT)
Age: 62
End: 2022-03-18

## 2022-03-21 ENCOUNTER — TRANSCRIPTION ENCOUNTER (OUTPATIENT)
Age: 62
End: 2022-03-21

## 2022-03-22 ENCOUNTER — TRANSCRIPTION ENCOUNTER (OUTPATIENT)
Age: 62
End: 2022-03-22

## 2022-03-25 NOTE — REVIEW OF SYSTEMS
[Fatigue] : no fatigue [Decreased Appetite] : appetite not decreased MED [Recent Weight Gain (___ Lbs)] : no recent weight gain [Recent Weight Loss (___ Lbs)] : no recent weight loss [Visual Field Defect] : no visual field defect [Dry Eyes] : no dryness [Dysphagia] : no dysphagia [Neck Pain] : no neck pain [Dysphonia] : no dysphonia [Nasal Congestion] : no nasal congestion [Chest Pain] : no chest pain [Slow Heart Rate] : heart rate is not slow [Palpitations] : no palpitations [Fast Heart Rate] : heart rate is not fast [Shortness Of Breath] : no shortness of breath [Cough] : no cough [Nausea] : no nausea [Constipation] : no constipation [Vomiting] : no vomiting [Diarrhea] : no diarrhea [Polyuria] : no polyuria [Irregular Menses] : regular menses [Joint Pain] : no joint pain [Muscle Weakness] : no muscle weakness [Acanthosis] : no acanthosis  [Acne] : no acne [Headaches] : no headaches [Dizziness] : no dizziness [Tremors] : no tremors [Depression] : no depression [Polydipsia] : no polydipsia [Cold Intolerance] : no cold intolerance [Easy Bleeding] : no ~M tendency for easy bleeding [Easy Bruising] : no tendency for easy bruising

## 2022-03-31 ENCOUNTER — TRANSCRIPTION ENCOUNTER (OUTPATIENT)
Age: 62
End: 2022-03-31

## 2022-04-22 ENCOUNTER — APPOINTMENT (OUTPATIENT)
Dept: MAMMOGRAPHY | Facility: CLINIC | Age: 62
End: 2022-04-22
Payer: COMMERCIAL

## 2022-04-22 ENCOUNTER — APPOINTMENT (OUTPATIENT)
Dept: ULTRASOUND IMAGING | Facility: CLINIC | Age: 62
End: 2022-04-22
Payer: COMMERCIAL

## 2022-04-22 PROCEDURE — 77063 BREAST TOMOSYNTHESIS BI: CPT

## 2022-04-22 PROCEDURE — 77067 SCR MAMMO BI INCL CAD: CPT

## 2022-04-22 PROCEDURE — 76641 ULTRASOUND BREAST COMPLETE: CPT | Mod: 50

## 2022-05-04 ENCOUNTER — APPOINTMENT (OUTPATIENT)
Dept: ULTRASOUND IMAGING | Facility: CLINIC | Age: 62
End: 2022-05-04
Payer: COMMERCIAL

## 2022-05-04 PROCEDURE — 76642 ULTRASOUND BREAST LIMITED: CPT | Mod: LT

## 2022-05-13 ENCOUNTER — RESULT REVIEW (OUTPATIENT)
Age: 62
End: 2022-05-13

## 2022-05-13 ENCOUNTER — OUTPATIENT (OUTPATIENT)
Dept: OUTPATIENT SERVICES | Facility: HOSPITAL | Age: 62
LOS: 1 days | End: 2022-05-13
Payer: COMMERCIAL

## 2022-05-13 ENCOUNTER — APPOINTMENT (OUTPATIENT)
Dept: ULTRASOUND IMAGING | Facility: IMAGING CENTER | Age: 62
End: 2022-05-13
Payer: COMMERCIAL

## 2022-05-13 DIAGNOSIS — Z00.8 ENCOUNTER FOR OTHER GENERAL EXAMINATION: ICD-10-CM

## 2022-05-13 PROCEDURE — 77065 DX MAMMO INCL CAD UNI: CPT | Mod: 26,LT

## 2022-05-13 PROCEDURE — 77065 DX MAMMO INCL CAD UNI: CPT

## 2022-05-13 PROCEDURE — 19083 BX BREAST 1ST LESION US IMAG: CPT

## 2022-05-13 PROCEDURE — 88305 TISSUE EXAM BY PATHOLOGIST: CPT | Mod: 26

## 2022-05-13 PROCEDURE — 19083 BX BREAST 1ST LESION US IMAG: CPT | Mod: LT

## 2022-05-13 PROCEDURE — 88305 TISSUE EXAM BY PATHOLOGIST: CPT

## 2022-05-13 PROCEDURE — A4648: CPT

## 2022-05-17 LAB — SURGICAL PATHOLOGY STUDY: SIGNIFICANT CHANGE UP

## 2022-08-07 ENCOUNTER — NON-APPOINTMENT (OUTPATIENT)
Age: 62
End: 2022-08-07

## 2022-08-15 ENCOUNTER — APPOINTMENT (OUTPATIENT)
Dept: INTERNAL MEDICINE | Facility: CLINIC | Age: 62
End: 2022-08-15

## 2022-08-15 ENCOUNTER — NON-APPOINTMENT (OUTPATIENT)
Age: 62
End: 2022-08-15

## 2022-08-15 VITALS — DIASTOLIC BLOOD PRESSURE: 80 MMHG | SYSTOLIC BLOOD PRESSURE: 132 MMHG | HEART RATE: 66 BPM

## 2022-08-15 VITALS
HEART RATE: 74 BPM | SYSTOLIC BLOOD PRESSURE: 144 MMHG | TEMPERATURE: 98.1 F | RESPIRATION RATE: 16 BRPM | HEIGHT: 64 IN | BODY MASS INDEX: 31.76 KG/M2 | WEIGHT: 186 LBS | DIASTOLIC BLOOD PRESSURE: 82 MMHG | OXYGEN SATURATION: 96 %

## 2022-08-15 DIAGNOSIS — R79.89 OTHER SPECIFIED ABNORMAL FINDINGS OF BLOOD CHEMISTRY: ICD-10-CM

## 2022-08-15 DIAGNOSIS — Z87.898 PERSONAL HISTORY OF OTHER SPECIFIED CONDITIONS: ICD-10-CM

## 2022-08-15 DIAGNOSIS — H90.3 SENSORINEURAL HEARING LOSS, BILATERAL: ICD-10-CM

## 2022-08-15 DIAGNOSIS — Z86.59 PERSONAL HISTORY OF OTHER MENTAL AND BEHAVIORAL DISORDERS: ICD-10-CM

## 2022-08-15 DIAGNOSIS — Z86.69 PERSONAL HISTORY OF OTHER DISEASES OF THE NERVOUS SYSTEM AND SENSE ORGANS: ICD-10-CM

## 2022-08-15 PROCEDURE — 36415 COLL VENOUS BLD VENIPUNCTURE: CPT

## 2022-08-15 PROCEDURE — 99396 PREV VISIT EST AGE 40-64: CPT | Mod: 25

## 2022-08-15 PROCEDURE — 93000 ELECTROCARDIOGRAM COMPLETE: CPT | Mod: 59

## 2022-08-15 PROCEDURE — G0447 BEHAVIOR COUNSEL OBESITY 15M: CPT | Mod: NC

## 2022-08-15 NOTE — HEALTH RISK ASSESSMENT
[No falls in past year] : Patient reported no falls in the past year [0] : 2) Feeling down, depressed, or hopeless: Not at all (0) [PHQ-2 Negative - No further assessment needed] : PHQ-2 Negative - No further assessment needed [Patient reported mammogram was normal] : Patient reported mammogram was normal [Patient reported PAP Smear was normal] : Patient reported PAP Smear was normal [HIV test declined] : HIV test declined [Hepatitis C test declined] : Hepatitis C test declined [Fully functional (bathing, dressing, toileting, transferring, walking, feeding)] : Fully functional (bathing, dressing, toileting, transferring, walking, feeding) [Fully functional (using the telephone, shopping, preparing meals, housekeeping, doing laundry, using] : Fully functional and needs no help or supervision to perform IADLs (using the telephone, shopping, preparing meals, housekeeping, doing laundry, using transportation, managing medications and managing finances) [Smoke Detector] : smoke detector [Carbon Monoxide Detector] : carbon monoxide detector [Seat Belt] :  uses seat belt [Sunscreen] : uses sunscreen [With Patient/Caregiver] : , with patient/caregiver [Patient reported colonoscopy was normal] : Patient reported colonoscopy was normal [Feels Safe at Home] : Feels safe at home [[id number]] : 0 [Guns at Home] : no guns at home [MammogramDate] : 01/22 [PapSmearDate] : 01/22 [BoneDensityDate] : 12/20 [BoneDensityComments] : osteopenia [ColonoscopyDate] : 08/20 [ColonoscopyComments] : no polyps (hx polyps) rec: 5 yrs per pt [HIVDate] : 08/11 [HIVComments] : negative [HepatitisCDate] : 12/16 [HepatitisCComments] : negative [AdvancecareDate] : 08/22

## 2022-08-15 NOTE — ASSESSMENT
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 Last 2/21.\par -hx negative CT head 2014\par -11/20 echo: EF 65%, nl LV/RV fxn and size, min AR, nl MV\par -1/21 MRI head/MRA/venogram: mild volume loss, partially empty sella, microvascular disease. No acute hemorrhage, restricted diffusion or shift. No significant stenosis. High riding left jugular bulb, this can be associated with pulsatile tinnitus.\par -followed by cardio, last seen 9/20- noted hx syncope and HLD and declined Rx for HLD. Repeat echo ordered- told nl since. Reported since then, discussed with cardio 2/21 lipids and after reviewed with cardio, advised cont ASA and started on Crestor 10 mg with plans for repeat lipids after 2 mo. F/U pending.\par -following with neuro, seen 12/20 with MRI/A ordered, done 1/21 and notified mainly age-related changes, no acute pathology. Had f/u 3/21 with imaging discussed and no intervention advised except reported worsened tinnitus and awaiting ENT f/u- advised f/u re: ? jugular bulb findings on imaging have any bearing on tinnitus. Advised to f/u prn.\par -on Crestor 10 mg since 2/21\par -advised to stay well hydrated\par -advised prompt medical eval if LOC recurs or new neurologic sx's arise\par \par HLD- 2/22 Tchol 203   HDL 53; 10/21 LFTs wnl\par -screening EKG: NSR @ 64 bpm, nl axis, no LVH/path Q/ST chgs (no chg c/w 9/20)\par -11/20 echo: EF 65%, nl LV/RV fxn and size, min AR, nl MV\par -followed by cardio, last seen 9/20- noted hx syncope and HLD and declined Rx for HLD. Repeat echo ordered- told nl since. Reported since then, discussed with cardio 2/21 lipids and after reviewed with cardio, advised cont ASA and started on Crestor 10mg with plans for repeat lipids after 2 mo. F/U pending.\par -on Crestor 10 mg since 2/21\par -low fat diet, exercise and wt loss advised\par -check lipids, cmp\par \par hx tinnitus (high pitched and occasionally pulsatile), hearing loss- stable\par -followed by ENT 9/20, told has mild hearing loss but no intervention advised. F/U pending\par -following with neuro, seen 12/20 with MRI/A ordered, done 1/21 and notified mainly age-related changes, no acute pathology. Had f/u 3/21 with imaging discussed and no intervention advised except reported worsened tinnitus and awaiting ENT f/u- advised f/u re: ? jugular bulb findings on imaging have any bearing on tinnitus. Advised to f/u prn.\par \par osteopenia, hx thyroid nodules-\par -8/21 TSH/vit d wnl\par -12/20 DEXA osteopenia by GYN\par -12/20 thyroid US: reports last done 2010, not 8/20 as per report)- will forward prior endo/US records\par multiple thyroid nodules: on right [id number], [id number] and 4x3mm colloid follicle; on left: [id number], 5x4mm\par -followed by endo, seen 2/21 with US thyroid reviewed, advised f/u in 18 mo with repeat US then planned.  Advised ca+D qd per pt.  Has f/u appt 8/26/22. \par -CA/D, exercise encouraged\par -check TSH\par \par depression- reports stable; denies HI/SI or panic attacks\par -followed by psychiatry, last seen 7/22 no med changes made, has f/u 9/22\par -on fluoxetine 60 mg by psychiatry\par -on Rexulti 0.5mg qd since 4/22\par -doing therapy (SW) weekly, feels is help\par \par obesity- BMI 31\par -risks of obesity discussed\par -benefits of weight loss discussed\par -low fat, low cholesterol, low carbohydrate diet advised\par -smaller portion sizes encouraged\par -advised avoidance of sugary drinks\par -aerobic exercise encouraged\par -weight loss advised\par -counseling time 15 minutes\par \par \par MISC: Continued social distancing and measure for covid19 prevention encouraged. \par -hx covid vaccine series (moderna); hx boosters (11/21, 4/22)\par \par \par HCM\par -check screening labs; declines HIV/STD screening\par -hx negative hep C screening 8/11\par -hx flu shot 10/21\par -hx Tdap 12/20\par -hx shingles vaccine 7/19\par -4/13 labs: immune to MMR/Varicella\par -hx negative PAP 2022 by GYN, Dr. Gee per pt\par -hx mammo/US ordered by GYN- states had done 5/22 rec: left bx, done and told negative to repeat imaging in 1 yr for routine screening\par -hx screening colonoscopy 8/20: no polyps (hx polyps) rec: 5 yrs per pt. Asked to forward record.\par -followed by derm, yearly skin screening advised. Regular use of sun block for skin cancer prevention advised.\par -advised to designate HCP and provide copy of completed form for records\par \par Pt's cell: [phone] (confirmed with pt today)\par \par Labs drawn in office today.\par

## 2022-08-15 NOTE — HISTORY OF PRESENT ILLNESS
[Health Maintenance] : health maintenance [Spouse] : spouse [] :  [Working Full Time] : working full time [Occupation ___] : occupation: [unfilled] [Never Smoked Cigarettes] : has never smoked cigarettes [Occasional Use] : occasional alcohol use [Never] : has never used illicit drugs [Good] : good [Reg. Dental Visits] : She has regular dental visits [Hearing Loss] : She has hearing loss [Postmenopausal] : the patient is postmenopausal [FreeTextEntry1] : \par CPE\par  [Binge Drinking] : denies binge drinking [Patient Concern] : no personal concern about alcohol use [Family Concern] : no family concern about alcohol use [Vision Problems] : She denies vision problems [de-identified] : 8/21 [de-identified] : hx flu shot 10/21, hx Tdap 12/20, hx shingles vaccine 7/19 [de-identified] : \par 61 yo F pmhx HLD, hx syncope (hx w/u with echo showing MV thickening), hx thyroiditis, thyroid nodules, osteopenia, depression, obesity for CPE\par \par Last seen in office in 2/22 for f/u with labs done.\par \par Feeling well.\par Needs med refill.\par Fasting for labs.\par \par Reports is socially distancing and using precautions for covid prevention. 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 after had vision issue- remains resolved\par -followed by retinal specialist, sates last seen 7/22, told nl exam and advised to f/u in 1 yr\par -followed by gen optho, states seen 7/22, told nl exm and to f/u in 1 yr\par -denies vision trouble or eye pain\par \par hx joint pains on/off x few months, mainly in knees and elbows- states self resolved since last visit, comes and goes but not too bothersome and no meds taken for it.\par knees > elbows  \par +knee stiffness, resolved after 1-2 hrs of movement in mornings\par -denies swelling, redness, paresthesias, focal weakness, falls, muscle aching \par -Applying heat/massage helping. \par -ortho eval pending\par -xray pending\par \par hx recurrent syncope (hx w/u with echo showing MV thickening), dizziness/vertigo (?BPPV)- no recent issues\par -last syncope 11/20\par -last near syncope 2/21 "felt "could have passed out" while was walking in snow after shoveling ~ 10 mins, states felt sweaty and generalized weakness so sat down and felt better after 10 mins. Notes has eaten a meal 2 hrs prior. Denies recurrence since\par -hx chronic hx dizziness- mainly room spinning sensation on/off x many yrs\par -feels onset is mainly with laying flat or with rapid head movements\par -denies HA, vision issues, dysarthria, focal weakness or ear sx's with it\par -followed by cardio, last seen 9/20- noted hx syncope and HLD and declined Rx for HLD. Repeat echo ordered- told nl since. Reported since then, discussed with cardio 2/21 lipids and after reviewed with cardio, advised cont ASA and started on Crestor 10 mg with plans for repeat lipids after 2 mo. F/U pending.\par -following with neuro, seen 12/20 with MRI/A ordered, done 1/21 and notified mainly age-related changes, no acute pathology. Had f/u 3/21 with imaging discussed and no intervention advised except awaiting ENT f/u- advised f/u re: ? jugular bulb findings on imaging have any bearing on tinnitus. Advised to f/u prn.\par \par HLD, hx syncope- \par -followed by cardio, last seen 9/20- noted hx syncope and HLD and declined Rx for HLD. Repeat echo ordered- told nl since. Reported since then, discussed with cardio 2/21 lipids and after reviewed with cardio, advised cont ASA and started on Crestor 10 mg with plans for repeat lipids after 2 mo. F/U pending.\par -on Crestor 10 since 2/21\par -reports not adherent with low fat diet\par -exercise: walks 3-4x/wk x 30 - 45 mins, also aerobic home video 1-2x/wk x 45 min - all done w/o sx's or limitations\par \par hx tinnitus (high pitched and occasionally pulsatile), hearing loss- reports is stable\par -seen by ENT 9/20, told has mild hearing loss but no intervention advised. F/U pending.\par \par osteopenia, hx thyroid nodules-\par -hx DEXA 12/20 by GYN\par -on CA/D\par -exercises\par -followed by endo, seen 2/21 with US thyroid reviewed, advised f/u in 18 mo with repeat US then planned. Advised ca+D qd per pt.  Has f/u 8/22.\par \par depression/anxiety- reports overall stable; denies HI/SI or panic attacks\par -followed by psychiatry, last seen 7/22 no med changes made, has f/u 9/22\par -on fluoxetine 60 mg by psychiatry\par -on Rexulti 0.5mg qd since 4/22\par -doing therapy (SW) weekly, feels is help\par -sleeping well, has good appetite\par \par \par Denies  complaints. \par -followed by GYN states seen 2022 with negative PAP and mammo/US ordered- had done 5/22 rec: left bx, done and told negative to repeat imaging in 1 yr for routine screening

## 2022-08-15 NOTE — REVIEW OF SYSTEMS
[Negative] : Neurological [FreeTextEntry4] : see HPI [FreeTextEntry9] : see HPI [de-identified] : see HPI

## 2022-08-15 NOTE — PHYSICAL EXAM
[No Acute Distress] : no acute distress [Well-Appearing] : well-appearing [Normal Sclera/Conjunctiva] : normal sclera/conjunctiva [PERRL] : pupils equal round and reactive to light [EOMI] : extraocular movements intact [Normal Outer Ear/Nose] : the outer ears and nose were normal in appearance [Normal Oropharynx] : the oropharynx was normal [Normal Nasal Mucosa] : the nasal mucosa was normal [No Lymphadenopathy] : no lymphadenopathy [Supple] : supple [Thyroid Normal, No Nodules] : the thyroid was normal and there were no nodules present [No Respiratory Distress] : no respiratory distress  [Clear to Auscultation] : lungs were clear to auscultation bilaterally [Normal Rate] : normal rate  [Regular Rhythm] : with a regular rhythm [Normal S1, S2] : normal S1 and S2 [No Murmur] : no murmur heard [No Carotid Bruits] : no carotid bruits [Pedal Pulses Present] : the pedal pulses are present [No Edema] : there was no peripheral edema [Soft] : abdomen soft [Non Tender] : non-tender [No HSM] : no HSM [Normal Supraclavicular Nodes] : no supraclavicular lymphadenopathy [Normal Posterior Cervical Nodes] : no posterior cervical lymphadenopathy [Normal Anterior Cervical Nodes] : no anterior cervical lymphadenopathy [No CVA Tenderness] : no CVA  tenderness [No Spinal Tenderness] : no spinal tenderness [No Joint Swelling] : no joint swelling [Grossly Normal Strength/Tone] : grossly normal strength/tone [No Rash] : no rash [No Focal Deficits] : no focal deficits [Normal Gait] : normal gait [Normal Affect] : the affect was normal [Alert and Oriented x3] : oriented to person, place, and time [Normal TMs] : both tympanic membranes were normal [de-identified] : scattered freckles

## 2022-08-16 ENCOUNTER — NON-APPOINTMENT (OUTPATIENT)
Age: 62
End: 2022-08-16

## 2022-08-16 LAB
25(OH)D3 SERPL-MCNC: 46 NG/ML
ALBUMIN SERPL ELPH-MCNC: 4.4 G/DL
ALP BLD-CCNC: 85 U/L
ALT SERPL-CCNC: 44 U/L
ANION GAP SERPL CALC-SCNC: 9 MMOL/L
APPEARANCE: CLEAR
AST SERPL-CCNC: 27 U/L
BACTERIA: NEGATIVE
BASOPHILS # BLD AUTO: 0.04 K/UL
BASOPHILS NFR BLD AUTO: 0.6 %
BILIRUB SERPL-MCNC: 0.3 MG/DL
BILIRUBIN URINE: NEGATIVE
BLOOD URINE: NEGATIVE
BUN SERPL-MCNC: 22 MG/DL
CALCIUM SERPL-MCNC: 10.1 MG/DL
CHLORIDE SERPL-SCNC: 103 MMOL/L
CHOLEST SERPL-MCNC: 188 MG/DL
CK SERPL-CCNC: 109 U/L
CO2 SERPL-SCNC: 27 MMOL/L
COLOR: NORMAL
CREAT SERPL-MCNC: 0.88 MG/DL
EGFR: 73 ML/MIN/1.73M2
EOSINOPHIL # BLD AUTO: 0.13 K/UL
EOSINOPHIL NFR BLD AUTO: 2.1 %
ESTIMATED AVERAGE GLUCOSE: 111 MG/DL
GLUCOSE QUALITATIVE U: NEGATIVE
GLUCOSE SERPL-MCNC: 92 MG/DL
HBA1C MFR BLD HPLC: 5.5 %
HCT VFR BLD CALC: 46.5 %
HDLC SERPL-MCNC: 50 MG/DL
HGB BLD-MCNC: 14.5 G/DL
HYALINE CASTS: 0 /LPF
IMM GRANULOCYTES NFR BLD AUTO: 0.3 %
KETONES URINE: NEGATIVE
LDLC SERPL CALC-MCNC: 116 MG/DL
LEUKOCYTE ESTERASE URINE: NEGATIVE
LYMPHOCYTES # BLD AUTO: 1.81 K/UL
LYMPHOCYTES NFR BLD AUTO: 29 %
MAN DIFF?: NORMAL
MCHC RBC-ENTMCNC: 28.4 PG
MCHC RBC-ENTMCNC: 31.2 GM/DL
MCV RBC AUTO: 91.2 FL
MICROSCOPIC-UA: NORMAL
MONOCYTES # BLD AUTO: 0.33 K/UL
MONOCYTES NFR BLD AUTO: 5.3 %
NEUTROPHILS # BLD AUTO: 3.91 K/UL
NEUTROPHILS NFR BLD AUTO: 62.7 %
NITRITE URINE: NEGATIVE
NONHDLC SERPL-MCNC: 138 MG/DL
PH URINE: 5.5
PLATELET # BLD AUTO: 346 K/UL
POTASSIUM SERPL-SCNC: 4.8 MMOL/L
PROT SERPL-MCNC: 6.9 G/DL
PROTEIN URINE: NEGATIVE
RBC # BLD: 5.1 M/UL
RBC # FLD: 12.4 %
RED BLOOD CELLS URINE: 0 /HPF
SODIUM SERPL-SCNC: 140 MMOL/L
SPECIFIC GRAVITY URINE: 1.01
SQUAMOUS EPITHELIAL CELLS: 0 /HPF
TRIGL SERPL-MCNC: 109 MG/DL
TSH SERPL-ACNC: 1.73 UIU/ML
UROBILINOGEN URINE: NORMAL
WBC # FLD AUTO: 6.24 K/UL
WHITE BLOOD CELLS URINE: 0 /HPF

## 2022-08-22 ENCOUNTER — TRANSCRIPTION ENCOUNTER (OUTPATIENT)
Age: 62
End: 2022-08-22

## 2022-08-26 ENCOUNTER — APPOINTMENT (OUTPATIENT)
Dept: ENDOCRINOLOGY | Facility: CLINIC | Age: 62
End: 2022-08-26

## 2022-08-26 VITALS
OXYGEN SATURATION: 98 % | TEMPERATURE: 98.3 F | HEIGHT: 64 IN | SYSTOLIC BLOOD PRESSURE: 120 MMHG | DIASTOLIC BLOOD PRESSURE: 86 MMHG | HEART RATE: 74 BPM

## 2022-08-26 PROCEDURE — 99214 OFFICE O/P EST MOD 30 MIN: CPT | Mod: 25

## 2022-08-26 PROCEDURE — 76536 US EXAM OF HEAD AND NECK: CPT

## 2022-08-26 RX ORDER — BREXPIPRAZOLE 0.5 MG/1
0.5 TABLET ORAL
Refills: 0 | Status: DISCONTINUED | COMMUNITY
End: 2022-08-26

## 2022-08-26 NOTE — PHYSICAL EXAM
[Alert] : alert [Well Nourished] : well nourished [No Acute Distress] : no acute distress [Normal Sclera/Conjunctiva] : normal sclera/conjunctiva [EOMI] : extra ocular movement intact [PERRL] : pupils equal, round and reactive to light [Normal Outer Ear/Nose] : the ears and nose were normal in appearance [Normal Hearing] : hearing was normal [Normal TMs] : both tympanic membranes were normal [No Neck Mass] : no neck mass was observed [No Respiratory Distress] : no respiratory distress [Clear to Auscultation] : lungs were clear to auscultation bilaterally [Normal S1, S2] : normal S1 and S2 [Normal Rate] : heart rate was normal [Regular Rhythm] : with a regular rhythm [Normal Bowel Sounds] : normal bowel sounds [Not Tender] : non-tender [Soft] : abdomen soft [Normal Gait] : normal gait [No Clubbing, Cyanosis] : no clubbing  or cyanosis of the fingernails [No Joint Swelling] : no joint swelling seen [Normal Strength/Tone] : muscle strength and tone were normal [No Rash] : no rash [No Skin Lesions] : no skin lesions [No Motor Deficits] : the motor exam was normal [Normal Reflexes] : deep tendon reflexes were 2+ and symmetric [No Tremors] : no tremors [Oriented x3] : oriented to person, place, and time [Normal Affect] : the affect was normal [Normal Insight/Judgement] : insight and judgment were intact [Normal Mood] : the mood was normal

## 2022-08-26 NOTE — ASSESSMENT
[FreeTextEntry1] : 60 year old female with history of multiple sub-cm nodules, osteopenia here for evaluation. \par \par Thyroid Nodules \par -Ultrasound reviewed with patient. \par -Multiple sub-cm nodules noted under < 1 cm with spongiform appearance\par -Not meeting FNA criteria at the time \par -Will follow up ultrasound in 18 months \par \par Osteopenia \par -L-spine: -1.8, Left neck: -0.1\par -Displaying stability in comparison to DEXA scan in 2018 \par -Continue Vitamin D and calcium supplementation \par -Weight bearing exercises are recommended \par -Follow up DEXA in 12/2022\par \par \par -Follow up in 18 months for in office ultrasound

## 2022-08-26 NOTE — PROCEDURE
[Projectioneering e 2008 model, 10-12 MHz frequencies] : multiple real time longitudinal and transverse images were obtained using a high resolution ultrasound with a linear transducer, Projectioneering e 2008 model, 10-12 MHz frequencies. All measurements will be reported as longitudinal x tyler-posterior x transverse. [] : a homogeneous parenchyma [Indistinct] : indistinct [No calcification] : no calcification [Mid] : mid pole there is a  [Right Thyroid] : right [Hypoechoic] : hypoechoic nodule [Upper] : upper pole there is a  [Mixed] : mixed [Thin] : has a thin halo [Left Thyroid] : left [Lower] : lower pole there is a  [Spongiform Appearance] : spongiform appearance [Ovoid] : ovoid in shape [Smooth] : smooth [No] : does not have a halo [No calcifications] : no calcifications [Peripheral vascularity] : peripheral vascularity [2] : 2 [No abnormal lymph nodes are seen.] : no abnormal lymph nodes are seen [FreeTextEntry1] : 3.83 x 1.86 x 1.66 [FreeTextEntry5] : 3.88 x 1.88 x 1.33 [FreeTextEntry2] : 0.39 [FreeTextEntry3] : 0.76 x 0.36 x 0.56

## 2022-08-26 NOTE — PROCEDURE
[Mobilewalla e 2008 model, 10-12 MHz frequencies] : multiple real time longitudinal and transverse images were obtained using a high resolution ultrasound with a linear transducer, Mobilewall[employer] e 2008 model, 10-12 MHz frequencies. All measurements will be reported as longitudinal x tyler-posterior x transverse. [] : a homogeneous parenchyma [Indistinct] : indistinct [No calcification] : no calcification [Mid] : mid pole there is a  [Right Thyroid] : right [Hypoechoic] : hypoechoic nodule [Upper] : upper pole there is a  [Mixed] : mixed [Thin] : has a thin halo [Left Thyroid] : left [Lower] : lower pole there is a  [Spongiform Appearance] : spongiform appearance [Ovoid] : ovoid in shape [Smooth] : smooth [No] : does not have a halo [No calcifications] : no calcifications [Peripheral vascularity] : peripheral vascularity [2] : 2 [No abnormal lymph nodes are seen.] : no abnormal lymph nodes are seen [FreeTextEntry1] : 3.83 x 1.86 x 1.66 [FreeTextEntry5] : 3.88 x 1.88 x 1.33 [FreeTextEntry2] : 0.39 [FreeTextEntry3] : 0.76 x 0.36 x 0.56

## 2022-08-26 NOTE — IMPRESSION
[FreeTextEntry1] : Multiple sub-cm nodules bilaterally  [FreeTextEntry2] : Follow up ultrasound in 12-18 months

## 2022-09-20 ENCOUNTER — TRANSCRIPTION ENCOUNTER (OUTPATIENT)
Age: 62
End: 2022-09-20

## 2022-10-11 ENCOUNTER — APPOINTMENT (OUTPATIENT)
Dept: OTOLARYNGOLOGY | Facility: CLINIC | Age: 62
End: 2022-10-11

## 2022-10-11 DIAGNOSIS — H93.13 TINNITUS, BILATERAL: ICD-10-CM

## 2022-10-11 PROCEDURE — 69210 REMOVE IMPACTED EAR WAX UNI: CPT

## 2022-10-11 PROCEDURE — 99213 OFFICE O/P EST LOW 20 MIN: CPT | Mod: 25

## 2022-10-11 NOTE — END OF VISIT
[FreeTextEntry3] : I personally saw and examined CARY RAIN in detail.  I spoke to LEXI Jaquez regarding the assessment and plan of care. I performed the procedures and relevant physical exam.  I have reviewed the above assessment and plan of care and I agree.  I have made changes to the body of the note wherever necessary and appropriate.\par

## 2022-10-11 NOTE — CONSULT LETTER
[FreeTextEntry1] : Dear Dr. VISHNU ALEJO \par I had the pleasure of evaluating your patient CARY RODRIGEZ, thank you for allowing us to participate in their care. please see full note detailing our visit below.\par If you have any questions, please do not hesitate to call me and I would be happy to discuss further. \par \par Kyle Schultz M.D.\par Attending Physician,  \par Department of Otolaryngology - Head and Neck Surgery\par Formerly [facility], [facility] \par Office: [phone]\par Fax: [phone]\par \par

## 2022-10-11 NOTE — HISTORY OF PRESENT ILLNESS
[de-identified] : pt with tinnitus b/l x year worse over the past few months b/l \par also with hearing loss b/l over the past few months getting worse  [FreeTextEntry1] : tinnitus is unchanged and hearing as well , prior audio in 2020 was wnl\par pt had seen neurology for syncope and MRI was ordered, sent for eval of findings of high riding left Jugular bulb extending into L vestibular aqueduct  on MRI

## 2022-10-11 NOTE — PROCEDURE
[FreeTextEntry3] : After informed verbal consent is obtained, cerumen is removed from the b/l ear canal with a curette & suction. Pt tolerated well, no residual ear canal irritation. \par \par

## 2022-10-11 NOTE — ASSESSMENT
[FreeTextEntry1] : Ms. RODRIGEZ is a 62 year female with b/l tinnitus which is unchanged over the last 2 years, on exam she has cerumen b/l which was removed today\par \par - MRI reviewed  - high riding left Jugular bulb extending into L vestibular aqueduct  on MRI \par seems unlikely to be cause of b/l tone tinnitus, discussed with pt \par \par patient with tone tinnitus - discussed pathophysiology of tinnitus and usual prognosis and treatment. will try otovits and masking techniques. If persist and bothersome can try pitch therapy, or acupuncture \par \par \par

## 2022-11-18 ENCOUNTER — APPOINTMENT (OUTPATIENT)
Dept: ORTHOPEDIC SURGERY | Facility: CLINIC | Age: 62
End: 2022-11-18

## 2022-11-18 VITALS — BODY MASS INDEX: 30.73 KG/M2 | HEIGHT: 64 IN | WEIGHT: 180 LBS

## 2022-11-18 PROCEDURE — 99215 OFFICE O/P EST HI 40 MIN: CPT

## 2022-11-18 PROCEDURE — 73564 X-RAY EXAM KNEE 4 OR MORE: CPT | Mod: 50

## 2022-11-18 NOTE — PHYSICAL EXAM
[de-identified] : General appearance: well nourished and hydrated, pleasant, alert and oriented x 3, cooperative.\par HEENT: Normocephalic, EOM intact, Nasal septum midline, Oral cavity clear, External auditory canal clear.\par Cardiovascular: no apparent abnormalities, no lower leg edema, no varicosities, pedal pulses are palpable.\par Lymphatics Lymph nodes: none palpated, Lymphedema: not present.\par Neurologic: sensation is normal, no muscle weakness in upper or lower extremities, patella tendon reflexes intact .\par Dermatologic no apparent skin lesions, moist, warm, no rash.\par Spine:cervical spine appears normal and moves freely, thoracic spine appears normal and moves freely, lumbosacral spine is tender, appears normal and moves freely.\par Gait: nonantalgic.\par \par Left knee\par Inspection: no erythema and effusion noted.\par Wounds: none.\par Alignment: normal.\par Palpation: no specific tenderness on palpation.\par ROM active (in degrees): 0-125 with pain and crepitus with arc of motion\par Ligamentous laxity: all ligaments appear stable,, negative ant. drawer test, negative post. drawer test, stable to varus stress test, stable to valgus stress test. negative Lachman's test, negative pivot shift test\par Meniscal Test: negative McMurrays, negative Rayna.\par Patellofemoral Alignment Test: Q angle-, normal.\par Muscle Test: good quad strength.\par \par Right knee\par Inspection: no erythema and effusion noted.\par Wounds: none.\par Alignment: normal\par Palpation: no specific tenderness on palpation.\par ROM active (in degrees): 0-125 with pain at extremes and flexion. popliteal angle of 60 degrees. crepitus with arc of motion\par Ligamentous laxity: all ligaments appear stable,, negative ant. drawer test, negative post. drawer test, stable to varus stress test, stable to valgus stress test. negative Lachman's test, negative pivot shift test\par Meniscal Test: positive McMurrays, positive Rayna - localizing to lateral \par Patellofemoral Alignment Test: Q angle-, normal.\par Muscle Test: good quad strength.\par \par Left hip\par Inspection: No swelling or ecchymosis.\par Wounds: none.\par Palpation: non-tender.\par Stability: no instability.\par Strength: 5/5 all motor groups.\par ROM: no pain with FROM.\par Leg length: equal.\par \par Right hip\par Inspection: No swelling or ecchymosis.\par Wounds: none.\par Palpation: non-tender.\par Stability: no instability.\par Strength: 5/5 all motor groups.\par ROM: no pain with FROM.\par Leg length: equal.\par \par Left ankle\par Inspection: no erythema noted, no swelling noted.\par Palpation: no pain on palpation .\par ROM: FROM without crepitus.\par Muscle strength: 5/5.\par Stability: no instability noted.\par \par Right ankle\par Inspection: no erythema noted, no swelling noted.\par ROM: FROM without crepitus.\par Palpation: no pain on palpation .\par Muscle strength: 5/5.\par Stability: no instability noted.\par \par Left foot\par Inspection: color, texture and turgor are normal. \par ROM: full range of motion of all joints without pain or crepitus.\par Palpation: no tenderness.\par Stability: no instability noted.\par \par Right foot\par Inspection: color, texture and turgor are normal.\par ROM: full range of motion of all joints without pain or crepitus.\par Palpation: no tenderness.\par Stability: no instability noted.\par \par Left shoulder\par Inspection: no muscle asymmetry, no atrophy.\par Palpation: no tenderness noted, ACJ non-tender.\par ROM: full active ROM, full passive ROM.\par Strength testing): anterior deltoid, supraspinatus, infraspinatus, subscapularis all 5/5.\par Stability test: ant. apprehension negative, post. apprehension negative, relocation test negative.\par Impingement Test: negative NEER.\par \par Right shoulder\par Inspection: no muscle asymmetry, no atrophy.\par Palpation: no tenderness noted, ACJ non-tender.\par ROM: full active ROM, full passive ROM.\par Strength testing): anterior deltoid, supraspinatus, infraspinatus, subscapularis all 5/5.\par Stability test: ant. apprehension negative, post. apprehension negative, relocation test negative.\par Impingement Test: negative NEER.\par Surgical Wounds: none.\par \par Left elbow\par Inspection: negative swelling.\par Wounds: none.\par Palpation: non-tender.\par ROM: full ROM.\par Strength: 5/5 all groups.\par Stability: no instability.\par Mass: none.\par \par Right elbow\par Inspection: negative swelling.\par Wounds: none.\par Palpation: non-tender.\par ROM: full ROM.\par Strength: 5/5 all groups.\par Stability: no instability.\par Mass: none.\par \par Left wrist\par Inspection: negative swelling.\par Wound: none.\par Palpation (bone): no tenderness.\par ROM: full ROM.\par Strength: full , good.\par \par Right wrist\par Inspection: negative swelling.\par Wound: none.\par Palpation (bone): no tenderness.\par ROM: full ROM.\par Strength: full , good.\par \par Left hand \par Inspection: no skin changes, normal appearance.\par Wounds: none. \par Strength: full , able to make full fist.\par Sensation: light touch intact all fingers and thumb.\par Vascular: good capillary refill < 3 seconds, all fingers and thumb.\par Mass: none. \par \par Right hand\par Inspection: no skin changes, normal appearance. \par Wounds: none.\par Strength: full , able to make full fist. \par Sensation: light touch intact all fingers and thumb. \par Vascular: good capillary refill < 3 seconds, all fingers and thumb.\par Mass: none.  [de-identified] : Right knee xrays, standing AP/Lateral and Merchant films, and 45 degree PA standing view, taken at the office today shows diffuse tricompartmental degenerative arthritis, medial joint space narrowing, patellofemoral joint space narrowing\par \par Left knee xrays, standing AP/Lateral and Merchant films, and 45 degree PA standing view, taken at the office today shows diffuse tricompartmental degenerative arthritis, medial joint space narrowing, patellofemoral joint space narrowing

## 2022-11-18 NOTE — ADDENDUM
[FreeTextEntry1] : This note was written by Rosalba Carlos on 11/18/2022 acting as scribe for Dr. Jaime Tejada M.D.\par \par I, Dr. Jaime Tejada, have read and attest that all the information, medical decision making and discharge instructions within are true and accurate.

## 2022-11-18 NOTE — HISTORY OF PRESENT ILLNESS
[de-identified] : CARY RODRIGEZ is a 63 yo F who presents today for initial evaluation of her bilateral knee symptoms. Chronic pain R>L for about 3 years. She denies any injuries. She says that the pain is inside the joint below the knee cap. On right knee it's on lateral side of the joint. The pain is dull aching constant pain with some intermittent sharp pains. Pain could increase on different occasions especially weight bearing activities. She also experiences loss of motion in both knees. Denies swelling. No buckling, locking, clicking. She can walk as far as she needs to with occasional pain. On the stairs, she holds railing both ways. She does take Advil. She states he never had prior treatment to her knees. She thinks she saw Dr. Tejada 10 years ago but there was no treatment at the time. She would like to discuss her bilateral symptoms and treatment plans with Dr. Tejada. \par

## 2022-11-18 NOTE — DISCUSSION/SUMMARY
[de-identified] : Discussed at length the nature of the patient’s condition. Bilateral knee symptoms are secondary to bilateral patellofemoral arthritis. At this time, I'm more concerned for her right knee. Her right knee symptoms appear secondary to a probable tear of the medial meniscus. Suggested we obtain an MRI of the right knee to evaluate intraarticular pathology. If MRI shows a lateral meniscus tear, we will give thought to a surgical arthroscopy. In the interim, I have recommended a course of physiotherapy, cryotherapy and Advil or Aleve prn. They can continue activities as tolerated. When results are available, we will discuss further. 		 \par \par

## 2022-11-21 ENCOUNTER — NON-APPOINTMENT (OUTPATIENT)
Age: 62
End: 2022-11-21

## 2022-12-05 ENCOUNTER — APPOINTMENT (OUTPATIENT)
Dept: MRI IMAGING | Facility: CLINIC | Age: 62
End: 2022-12-05

## 2022-12-05 ENCOUNTER — OUTPATIENT (OUTPATIENT)
Dept: OUTPATIENT SERVICES | Facility: HOSPITAL | Age: 62
LOS: 1 days | End: 2022-12-05
Payer: COMMERCIAL

## 2022-12-05 DIAGNOSIS — M25.561 PAIN IN RIGHT KNEE: ICD-10-CM

## 2022-12-05 DIAGNOSIS — Z00.8 ENCOUNTER FOR OTHER GENERAL EXAMINATION: ICD-10-CM

## 2022-12-05 PROCEDURE — 73721 MRI JNT OF LWR EXTRE W/O DYE: CPT

## 2022-12-05 PROCEDURE — 73721 MRI JNT OF LWR EXTRE W/O DYE: CPT | Mod: 26,RT

## 2022-12-07 ENCOUNTER — NON-APPOINTMENT (OUTPATIENT)
Age: 62
End: 2022-12-07

## 2022-12-12 ENCOUNTER — APPOINTMENT (OUTPATIENT)
Dept: ORTHOPEDIC SURGERY | Facility: CLINIC | Age: 62
End: 2022-12-12

## 2022-12-12 DIAGNOSIS — S83.281A OTHER TEAR OF LATERAL MENISCUS, CURRENT INJURY, RIGHT KNEE, INITIAL ENCOUNTER: ICD-10-CM

## 2022-12-12 PROCEDURE — G2012 BRIEF CHECK IN BY MD/QHP: CPT

## 2023-01-16 ENCOUNTER — APPOINTMENT (OUTPATIENT)
Dept: RADIOLOGY | Facility: CLINIC | Age: 63
End: 2023-01-16
Payer: COMMERCIAL

## 2023-01-16 PROCEDURE — 77080 DXA BONE DENSITY AXIAL: CPT

## 2023-01-24 ENCOUNTER — APPOINTMENT (OUTPATIENT)
Dept: FAMILY MEDICINE | Facility: CLINIC | Age: 63
End: 2023-01-24
Payer: COMMERCIAL

## 2023-01-24 ENCOUNTER — NON-APPOINTMENT (OUTPATIENT)
Age: 63
End: 2023-01-24

## 2023-01-24 ENCOUNTER — TRANSCRIPTION ENCOUNTER (OUTPATIENT)
Age: 63
End: 2023-01-24

## 2023-01-24 PROCEDURE — 99213 OFFICE O/P EST LOW 20 MIN: CPT | Mod: 95

## 2023-01-24 RX ORDER — FLUOXETINE HYDROCHLORIDE 40 MG/1
CAPSULE ORAL
Refills: 0 | Status: DISCONTINUED | COMMUNITY
Start: 2018-11-09 | End: 2023-01-24

## 2023-01-24 NOTE — HISTORY OF PRESENT ILLNESS
[Home] : at home, [unfilled] , at the time of the visit. [Medical Office: ([facility])___] : at the medical office located in  [Verbal consent obtained from patient] : the patient, [unfilled] [FreeTextEntry8] : CARY RODRIGEZ is a 62 year old female presenting with COVID.  Felt unwell night before tested COVID-positive this morning.  Low-grade fever, no shortness of breath, has slight cough.

## 2023-01-24 NOTE — PLAN
[FreeTextEntry1] : Benefits/risks of Paxlovid discussed with patient. Patient chart reviewed including current medications and recent labs. Instructed to f/u with any adverse effects. \par Counseling provided on when to go to ER.\par \par Drink plenty of fluids. \par May take Advil/Tylenol as needed for pain/fever. \par F/u if no improvement noted. \par \par Advised on criteria for return to work after COVID-19 infection: \par 1. At least 5 days since onset of symptoms. \par 2. May end isolation at day 5 if substantial improvement noted and mostly resolved symptoms.\par 3. Fever-free for 24 hours (without the use of antipyretics).\par Referred to CDC guidelines as well.\par

## 2023-02-14 ENCOUNTER — RX RENEWAL (OUTPATIENT)
Age: 63
End: 2023-02-14

## 2023-02-23 ENCOUNTER — APPOINTMENT (OUTPATIENT)
Dept: FAMILY MEDICINE | Facility: CLINIC | Age: 63
End: 2023-02-23
Payer: COMMERCIAL

## 2023-02-23 VITALS
WEIGHT: 181 LBS | BODY MASS INDEX: 30.9 KG/M2 | HEART RATE: 68 BPM | HEIGHT: 64 IN | OXYGEN SATURATION: 99 % | TEMPERATURE: 97.9 F | SYSTOLIC BLOOD PRESSURE: 122 MMHG | DIASTOLIC BLOOD PRESSURE: 78 MMHG

## 2023-02-23 PROCEDURE — 36415 COLL VENOUS BLD VENIPUNCTURE: CPT | Mod: 59

## 2023-02-23 PROCEDURE — 99213 OFFICE O/P EST LOW 20 MIN: CPT | Mod: 25

## 2023-02-23 NOTE — PLAN
[FreeTextEntry1] : 63 yo F for cough. Discussed course prednisone along w/ albuterol PRN. If despite course prednisone and no improvement in 2 weeks time, CXR to be done. RTC in 4 weeks for f/u. Signs and symptoms warranting further eval advised. \par \par HLD. Cont statin. Lipid panel obtained today. \par \par All questions answered. Pt voiced understanding and in agreement to plan.

## 2023-02-23 NOTE — HISTORY OF PRESENT ILLNESS
[FreeTextEntry1] : f/u [de-identified] : 61 yo F for f/u. \par \par Pt reports having had COVID towards the end of Jan 2023. \par \par Prior to having COVID, pt had sinus infection at the end of last year. She was treated with abx and steroids. \par \par Noting residual cough now. Not really using any medications. No fever, shortness of breath or sputum production. \par \par Additionally, pt due for rpt lipid panel. On statin.

## 2023-02-24 ENCOUNTER — NON-APPOINTMENT (OUTPATIENT)
Age: 63
End: 2023-02-24

## 2023-02-24 LAB
ALBUMIN SERPL ELPH-MCNC: 4.4 G/DL
ALP BLD-CCNC: 88 U/L
ALT SERPL-CCNC: 31 U/L
ANION GAP SERPL CALC-SCNC: 12 MMOL/L
AST SERPL-CCNC: 24 U/L
BILIRUB SERPL-MCNC: 0.5 MG/DL
BUN SERPL-MCNC: 17 MG/DL
CALCIUM SERPL-MCNC: 10.1 MG/DL
CHLORIDE SERPL-SCNC: 103 MMOL/L
CHOLEST SERPL-MCNC: 175 MG/DL
CO2 SERPL-SCNC: 24 MMOL/L
CREAT SERPL-MCNC: 0.79 MG/DL
EGFR: 83 ML/MIN/1.73M2
GLUCOSE SERPL-MCNC: 93 MG/DL
HDLC SERPL-MCNC: 49 MG/DL
LDLC SERPL CALC-MCNC: 107 MG/DL
NONHDLC SERPL-MCNC: 126 MG/DL
POTASSIUM SERPL-SCNC: 4.3 MMOL/L
PROT SERPL-MCNC: 6.7 G/DL
SODIUM SERPL-SCNC: 139 MMOL/L
TRIGL SERPL-MCNC: 96 MG/DL

## 2023-05-04 ENCOUNTER — NON-APPOINTMENT (OUTPATIENT)
Age: 63
End: 2023-05-04

## 2023-08-29 ENCOUNTER — APPOINTMENT (OUTPATIENT)
Dept: INTERNAL MEDICINE | Facility: CLINIC | Age: 63
End: 2023-08-29
Payer: COMMERCIAL

## 2023-08-29 ENCOUNTER — LABORATORY RESULT (OUTPATIENT)
Age: 63
End: 2023-08-29

## 2023-08-29 ENCOUNTER — NON-APPOINTMENT (OUTPATIENT)
Age: 63
End: 2023-08-29

## 2023-08-29 VITALS
HEART RATE: 85 BPM | WEIGHT: 183 LBS | OXYGEN SATURATION: 98 % | HEIGHT: 64 IN | SYSTOLIC BLOOD PRESSURE: 142 MMHG | DIASTOLIC BLOOD PRESSURE: 82 MMHG | BODY MASS INDEX: 31.24 KG/M2 | TEMPERATURE: 98.5 F

## 2023-08-29 VITALS — SYSTOLIC BLOOD PRESSURE: 134 MMHG | HEART RATE: 69 BPM | DIASTOLIC BLOOD PRESSURE: 80 MMHG

## 2023-08-29 DIAGNOSIS — M17.0 BILATERAL PRIMARY OSTEOARTHRITIS OF KNEE: ICD-10-CM

## 2023-08-29 DIAGNOSIS — Z87.39 PERSONAL HISTORY OF OTHER DISEASES OF THE MUSCULOSKELETAL SYSTEM AND CONNECTIVE TISSUE: ICD-10-CM

## 2023-08-29 DIAGNOSIS — H93.13 TINNITUS, BILATERAL: ICD-10-CM

## 2023-08-29 DIAGNOSIS — F32.A ANXIETY DISORDER, UNSPECIFIED: ICD-10-CM

## 2023-08-29 DIAGNOSIS — Z00.00 ENCOUNTER FOR GENERAL ADULT MEDICAL EXAMINATION W/OUT ABNORMAL FINDINGS: ICD-10-CM

## 2023-08-29 DIAGNOSIS — H61.23 IMPACTED CERUMEN, BILATERAL: ICD-10-CM

## 2023-08-29 DIAGNOSIS — F41.9 ANXIETY DISORDER, UNSPECIFIED: ICD-10-CM

## 2023-08-29 DIAGNOSIS — U07.1 COVID-19: ICD-10-CM

## 2023-08-29 PROCEDURE — 93000 ELECTROCARDIOGRAM COMPLETE: CPT

## 2023-08-29 PROCEDURE — 36415 COLL VENOUS BLD VENIPUNCTURE: CPT

## 2023-08-29 PROCEDURE — 99397 PER PM REEVAL EST PAT 65+ YR: CPT | Mod: 25

## 2023-08-29 RX ORDER — PREDNISONE 20 MG/1
20 TABLET ORAL
Qty: 10 | Refills: 0 | Status: DISCONTINUED | COMMUNITY
Start: 2023-02-23 | End: 2023-08-29

## 2023-08-29 RX ORDER — DULOXETINE HYDROCHLORIDE 60 MG/1
60 CAPSULE, DELAYED RELEASE PELLETS ORAL DAILY
Refills: 0 | Status: ACTIVE | COMMUNITY

## 2023-08-29 RX ORDER — BENZONATATE 200 MG/1
200 CAPSULE ORAL 3 TIMES DAILY
Qty: 15 | Refills: 0 | Status: DISCONTINUED | COMMUNITY
Start: 2023-01-24 | End: 2023-08-29

## 2023-08-29 RX ORDER — DULOXETINE HYDROCHLORIDE 40 MG/1
40 CAPSULE, DELAYED RELEASE PELLETS ORAL
Refills: 0 | Status: DISCONTINUED | COMMUNITY
End: 2023-08-29

## 2023-08-29 RX ORDER — NIRMATRELVIR AND RITONAVIR 300-100 MG
20 X 150 MG & KIT ORAL
Qty: 1 | Refills: 0 | Status: DISCONTINUED | COMMUNITY
Start: 2023-01-24 | End: 2023-08-29

## 2023-08-29 RX ORDER — ALBUTEROL SULFATE 90 UG/1
108 (90 BASE) INHALANT RESPIRATORY (INHALATION)
Qty: 1 | Refills: 1 | Status: DISCONTINUED | COMMUNITY
Start: 2023-01-24 | End: 2023-08-29

## 2023-08-29 NOTE — DATA REVIEWED
[FreeTextEntry1] : outside lab- to be scanned into chart  5/4/23 cmp wnl, except ca 10.5 cbc wnl hep B surf AB +, titer: 2334 hep C AB negative HIV negative

## 2023-08-29 NOTE — PHYSICAL EXAM
[No Acute Distress] : no acute distress [Well-Appearing] : well-appearing [Normal Sclera/Conjunctiva] : normal sclera/conjunctiva [PERRL] : pupils equal round and reactive to light [EOMI] : extraocular movements intact [Normal Outer Ear/Nose] : the outer ears and nose were normal in appearance [Normal Oropharynx] : the oropharynx was normal [Normal TMs] : both tympanic membranes were normal [Normal Nasal Mucosa] : the nasal mucosa was normal [No Lymphadenopathy] : no lymphadenopathy [Supple] : supple [Thyroid Normal, No Nodules] : the thyroid was normal and there were no nodules present [No Respiratory Distress] : no respiratory distress  [Clear to Auscultation] : lungs were clear to auscultation bilaterally [Normal Rate] : normal rate  [Regular Rhythm] : with a regular rhythm [Normal S1, S2] : normal S1 and S2 [No Murmur] : no murmur heard [No Carotid Bruits] : no carotid bruits [Pedal Pulses Present] : the pedal pulses are present [No Edema] : there was no peripheral edema [Soft] : abdomen soft [Non Tender] : non-tender [No HSM] : no HSM [No CVA Tenderness] : no CVA  tenderness [No Spinal Tenderness] : no spinal tenderness [No Joint Swelling] : no joint swelling [Grossly Normal Strength/Tone] : grossly normal strength/tone [No Rash] : no rash [No Focal Deficits] : no focal deficits [Normal Gait] : normal gait [Normal Affect] : the affect was normal [Alert and Oriented x3] : oriented to person, place, and time [Non-distended] : non-distended [No Masses] : no abdominal mass palpated [Normal Supraclavicular Nodes] : no supraclavicular lymphadenopathy [Normal Posterior Cervical Nodes] : no posterior cervical lymphadenopathy [Normal Anterior Cervical Nodes] : no anterior cervical lymphadenopathy [de-identified] : scattered freckles

## 2023-08-29 NOTE — HISTORY OF PRESENT ILLNESS
[Health Maintenance] : health maintenance [Spouse] : spouse [] :  [Working Full Time] : working full time [Occupation ___] : occupation: [unfilled] [Never Smoked Cigarettes] : has never smoked cigarettes [Occasional Use] : occasional alcohol use [Never] : has never used illicit drugs [Good] : good [Reg. Dental Visits] : She has regular dental visits [Hearing Loss] : She has hearing loss [Postmenopausal] : the patient is postmenopausal [FreeTextEntry1] : CPE [Binge Drinking] : denies binge drinking [Patient Concern] : no personal concern about alcohol use [Family Concern] : no family concern about alcohol use [Vision Problems] : She denies vision problems [de-identified] : 8/22 [de-identified] : hx flu shot 10/22, hx Tdap 12/20, hx shingles vaccine 7/19, hx hep B series [de-identified] : 64 yo F pmhx HLD, hx syncope (hx w/u with echo showing MV thickening), hx thyroiditis, thyroid nodules, osteopenia, depression, obesity for CPE   Feeling well. Needs med refill. Does not need med refills. Fasting for labs.  Denies sick or covid positive contacts. Denies fever, chills, cough or sob. -hx covid vaccine series (moderna), last 11/21 -hx covid infection 1/23- had mild sx's, had office tele visit and given Paxlovid (never took).  Noted had residual cough, seen in office 2/23 for eval with prednisone given and cxr advised if no better- states cough resolved on Rx so no cxr done.  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  States no glove worn. Was seen at  with labs done, has copy.  No issue with puncture site noted.  No medications given, states declined PEP. 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 -followed by gen optho, states seen 7/22, told nl exam and to f/u in 1 yr- has appt today -denies vision trouble or eye pain  hx b/l knee pain (R>L)- reports persistent with use, no better s/p PT as per ortho for OA/meniscal issue. -followed by ortho, last seen 11/22 with MRI advised (done 12/22) and advised PT course.  F/u pending- plans to f/u soon re: possible injections -denies swelling, redness, paresthesias, focal weakness or falls  -on advil prn, used rarely  HLD, hx recurrent syncope (hx w/u with echo showing MV thickening), dizziness/vertigo (?BPPV)- Has resolved > 1 yr.  Last syncope 11/20 -last near syncope 2/21 "felt "could have passed out" while was walking in snow after shoveling ~ 10 mins, states felt sweaty and generalized weakness so sat down and felt better after 10 mins. Notes has eaten a meal 2 hrs prior. Denies recurrence since -hx chronic hx dizziness- mainly room spinning sensation on/off x many yrs -feels onset is mainly with laying flat or with rapid head movements -denies HA, vision issues, dysarthria, focal weakness or ear sx's with it -hx followed by cardio, last seen 9/20- noted hx syncope and HLD and declined Rx for HLD. Repeat echo ordered- told nl since. Reported since then, discussed with cardio 2/21 lipids and after reviewed with cardio, advised cont ASA and started on Crestor 10 mg. F/U pending. -hx following with neuro, seen 12/20 with MRI/A ordered, done 1/21 and notified mainly age-related changes, no acute pathology. Had f/u 3/21 with imaging discussed and no intervention advised told to see ENT re: ? jugular bulb findings on imaging have any bearing on tinnitus. Advised to f/u prn. -seen by ENT10/22 with imaging reviewed, told not clinically relevant and no intervention advised -on Crestor 10 since 2/21 -reports adherent with low fat diet on/off -exercise: pickle ball 2-3x/wk x 2 hrs, walks 3-4x/wk x 30 - 45 mins (during school year), also aerobic home video 1-2x/wk x 45 min - all done w/o sx's or limitations  hx tinnitus, hearing loss- reports is stable -followed by ENT, seen 10/22 w/o intervention advised  osteopenia, hx thyroid nodules- -hx last DEXA 12/22 by GYN per pt -on Ca/D -exercises -followed by endo, seen 8/22 with US thyroid done, advised f/u in 18 mo with repeat US then planned.  depression/anxiety- reports overall stable; denies HI/SI or panic attacks -followed by psychiatry, last seen 7/23 with Duloxetine dose increased to 60- feels has helped mood.  Has f/u 10/23. -doing therapy (SW) weekly, feels is help  Denies  complaints.

## 2023-08-29 NOTE — REVIEW OF SYSTEMS
[Negative] : Neurological [FreeTextEntry4] : see HPI [FreeTextEntry9] : see HPI [de-identified] : see HPI

## 2023-08-29 NOTE — HEALTH RISK ASSESSMENT
[No falls in past year] : Patient reported no falls in the past year [0] : 2) Feeling down, depressed, or hopeless: Not at all (0) [PHQ-2 Negative - No further assessment needed] : PHQ-2 Negative - No further assessment needed [Patient reported mammogram was normal] : Patient reported mammogram was normal [Patient reported PAP Smear was normal] : Patient reported PAP Smear was normal [Patient reported colonoscopy was normal] : Patient reported colonoscopy was normal [Hepatitis C test declined] : Hepatitis C test declined [Feels Safe at Home] : Feels safe at home [Fully functional (bathing, dressing, toileting, transferring, walking, feeding)] : Fully functional (bathing, dressing, toileting, transferring, walking, feeding) [Fully functional (using the telephone, shopping, preparing meals, housekeeping, doing laundry, using] : Fully functional and needs no help or supervision to perform IADLs (using the telephone, shopping, preparing meals, housekeeping, doing laundry, using transportation, managing medications and managing finances) [Smoke Detector] : smoke detector [Carbon Monoxide Detector] : carbon monoxide detector [Seat Belt] :  uses seat belt [Sunscreen] : uses sunscreen [With Patient/Caregiver] : , with patient/caregiver [HIV Test offered] : HIV Test offered [Never] : Never [[id number]] : 0 [Guns at Home] : no guns at home [MammogramDate] : 01/23 [PapSmearDate] : 01/22 [BoneDensityDate] : 12/22 [BoneDensityComments] : osteopenia [ColonoscopyDate] : 08/20 [ColonoscopyComments] : no polyps (hx polyps) rec: 5 yrs per pt [HIVDate] : 05/23 [HIVComments] : negative [HepatitisCDate] : 05/23 [HepatitisCComments] : negative [AdvancecareDate] : 08/23

## 2023-08-29 NOTE — ASSESSMENT
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 S/p UC with negative labs. -notified since that child involved in incident is negative for infection (hep B/C and HIV) -use of gloves when handling needles/lancets counseled  -check hep B/C, HIV per request  hx b/l knee pain (R>L)- reports persistent with use, no better s/p PT as per ortho for OA/meniscal issue. -followed by ortho, last seen 11/22 with MRI advised (done 12/22) and advised PT course. 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  Last LOC 11/20. -hx near syncope (x2)- hx in 10/20 episode had onset after blood donation s/p ER eval thought vasovagal. Last 2/21. -hx negative CT head 2014 -11/20 echo: EF 65%, nl LV/RV fxn and size, min AR, nl MV -1/21 MRI head/MRA/venogram: mild volume loss, partially empty sella, microvascular disease. No acute hemorrhage, restricted diffusion or shift. No significant stenosis. High riding left jugular bulb, this can be associated with pulsatile tinnitus. -followed by cardio, last seen 9/20- noted hx syncope and HLD and declined Rx for HLD. Repeat echo ordered- told nl since. Reported since then, discussed with cardio 2/21 lipids and after reviewed with cardio, advised cont ASA and started on Crestor 10 mg. F/U pending. -following with neuro, seen 12/20 with MRI/A ordered, done 1/21 and notified mainly age-related changes, no acute pathology. Had f/u 3/21 with imaging discussed and no intervention advised except reported worsened tinnitus and awaiting ENT f/u- advised f/u re: ? jugular bulb findings on imaging have any bearing on tinnitus. Advised to f/u prn. -seen by ENT10/22 with imaging reviewed, told not clinically relevant and no intervention advised -on Crestor 10 since 2/21 -advised to stay well hydrated -advised prompt medical eval if LOC recurs or new neurologic sx's arise  HLD- 2/23 Tchol 175 TG 96  HDL 49; 8/22 LFTs wnl -screening EKG: NSR @ 68 bpm, nl axis, no LVH; flipped T: V1-2 (no chg c/w 8/20) -11/20 echo: EF 65%, nl LV/RV fxn and size, min AR, nl MV -followed by cardio, last seen 9/20- noted hx syncope and HLD and declined Rx for HLD. Repeat echo ordered- told nl since. Reported since then, discussed with cardio 2/21 lipids and after reviewed with cardio, advised cont ASA and started on Crestor 10mg with plans for repeat lipids after 2 mo. F/U pending. -on Crestor 10 mg since 2/21 -low fat diet, exercise and wt loss advised -check lipids, cmp  hx tinnitus, hearing loss- reports stable -followed by ENT, seen 10/22 w/o intervention advised.  o f/u yearly.  osteopenia, hx thyroid nodules- -12/22 DEXA osteopenia by GYN -followed by endo, seen 8/22 with US thyroid done, advised f/u in 18 mo with repeat US then planned. -on ca/D, exercise encouraged -check TSH  depression/anxiety- reports overall stable; denies HI/SI or panic attacks -followed by psychiatry, last seen 7/23 with Duloxetine dose increased to 60- feels has helped mood. Has f/u 10/23. -doing therapy (SW) weekly  obesity- -healthy diet, exercise, wt loss advised -check A1c  MISC: Continued social distancing and measure for covid19 prevention encouraged.  -hx covid vaccine series (moderna), last 11/21.  Booster advised.   HCM -check screening labs; agreeable to HIV/STD screening -hx negative hep C testing 5/23 -hx flu shot 10/22, yearly advised -hx Tdap 12/20 -hx hep B series -hx shingles vaccine 7/19 -advised to check on insurance coverage for prevnar 20 and f/u if desires.  VIS given. -4/13 labs: immune to MMR/Varicella -hx negative PAP 2022 by GYN, Dr. Gee per pt.  Plans to f/u yearly, but told no further PAPs needed. -hx negative mammo/US 1/23 by GYN per pt -hx screening colonoscopy 8/20: no polyps (hx polyps) rec: 5 yrs per pt. Asked to forward record. -followed by derm, yearly skin screening advised. Regular use of sun block for skin cancer prevention advised. -yearly dental screening advised -advised to designate HCP and provide copy of completed form for records  Pt's cell: [phone]   Labs drawn in office today.

## 2023-08-30 ENCOUNTER — TRANSCRIPTION ENCOUNTER (OUTPATIENT)
Age: 63
End: 2023-08-30

## 2023-09-01 LAB
25(OH)D3 SERPL-MCNC: 42.9 NG/ML
ALBUMIN SERPL ELPH-MCNC: 4.7 G/DL
ALP BLD-CCNC: 96 U/L
ALT SERPL-CCNC: 22 U/L
ANION GAP SERPL CALC-SCNC: 17 MMOL/L
APPEARANCE: CLEAR
AST SERPL-CCNC: 18 U/L
BASOPHILS # BLD AUTO: 0.04 K/UL
BASOPHILS NFR BLD AUTO: 0.4 %
BILIRUB SERPL-MCNC: 0.6 MG/DL
BILIRUBIN URINE: NEGATIVE
BLOOD URINE: NEGATIVE
BUN SERPL-MCNC: 27 MG/DL
C TRACH RRNA SPEC QL NAA+PROBE: NOT DETECTED
CALCIUM SERPL-MCNC: 10.2 MG/DL
CHLORIDE SERPL-SCNC: 102 MMOL/L
CHOLEST SERPL-MCNC: 193 MG/DL
CO2 SERPL-SCNC: 22 MMOL/L
COLOR: NORMAL
CREAT SERPL-MCNC: 0.85 MG/DL
EGFR: 76 ML/MIN/1.73M2
EOSINOPHIL # BLD AUTO: 0.05 K/UL
EOSINOPHIL NFR BLD AUTO: 0.5 %
ESTIMATED AVERAGE GLUCOSE: 108 MG/DL
GLUCOSE QUALITATIVE U: NEGATIVE MG/DL
GLUCOSE SERPL-MCNC: 99 MG/DL
HBA1C MFR BLD HPLC: 5.4 %
HBV CORE IGG+IGM SER QL: NONREACTIVE
HBV SURFACE AB SER QL: REACTIVE
HBV SURFACE AG SER QL: NONREACTIVE
HCT VFR BLD CALC: 45.2 %
HCV AB SER QL: NONREACTIVE
HCV S/CO RATIO: 0.11 S/CO
HDLC SERPL-MCNC: 52 MG/DL
HGB BLD-MCNC: 13.8 G/DL
HIV1+2 AB SPEC QL IA.RAPID: NONREACTIVE
IMM GRANULOCYTES NFR BLD AUTO: 0.3 %
KETONES URINE: NEGATIVE MG/DL
LDLC SERPL CALC-MCNC: 117 MG/DL
LEUKOCYTE ESTERASE URINE: ABNORMAL
LYMPHOCYTES # BLD AUTO: 1.39 K/UL
LYMPHOCYTES NFR BLD AUTO: 14.2 %
MAN DIFF?: NORMAL
MCHC RBC-ENTMCNC: 28 PG
MCHC RBC-ENTMCNC: 30.5 GM/DL
MCV RBC AUTO: 91.9 FL
MONOCYTES # BLD AUTO: 0.42 K/UL
MONOCYTES NFR BLD AUTO: 4.3 %
N GONORRHOEA RRNA SPEC QL NAA+PROBE: NOT DETECTED
NEUTROPHILS # BLD AUTO: 7.87 K/UL
NEUTROPHILS NFR BLD AUTO: 80.3 %
NITRITE URINE: NEGATIVE
NONHDLC SERPL-MCNC: 142 MG/DL
PH URINE: 5.5
PLATELET # BLD AUTO: 350 K/UL
POTASSIUM SERPL-SCNC: 4.9 MMOL/L
PROT SERPL-MCNC: 7.1 G/DL
PROTEIN URINE: NEGATIVE MG/DL
RBC # BLD: 4.92 M/UL
RBC # FLD: 12.9 %
SODIUM SERPL-SCNC: 142 MMOL/L
SOURCE AMPLIFICATION: NORMAL
SPECIFIC GRAVITY URINE: 1.02
T PALLIDUM AB SER QL IA: NEGATIVE
TRIGL SERPL-MCNC: 142 MG/DL
TSH SERPL-ACNC: 2.21 UIU/ML
UROBILINOGEN URINE: 0.2 MG/DL
WBC # FLD AUTO: 9.8 K/UL

## 2023-11-06 ENCOUNTER — RX RENEWAL (OUTPATIENT)
Age: 63
End: 2023-11-06

## 2024-01-03 ENCOUNTER — RESULT REVIEW (OUTPATIENT)
Age: 64
End: 2024-01-03

## 2024-01-15 ENCOUNTER — APPOINTMENT (OUTPATIENT)
Dept: ULTRASOUND IMAGING | Facility: CLINIC | Age: 64
End: 2024-01-15
Payer: COMMERCIAL

## 2024-01-15 ENCOUNTER — APPOINTMENT (OUTPATIENT)
Dept: MAMMOGRAPHY | Facility: CLINIC | Age: 64
End: 2024-01-15
Payer: COMMERCIAL

## 2024-01-15 PROCEDURE — 76641 ULTRASOUND BREAST COMPLETE: CPT | Mod: 50

## 2024-01-15 PROCEDURE — 77063 BREAST TOMOSYNTHESIS BI: CPT

## 2024-01-15 PROCEDURE — 77067 SCR MAMMO BI INCL CAD: CPT

## 2024-02-01 ENCOUNTER — APPOINTMENT (OUTPATIENT)
Dept: ENDOCRINOLOGY | Facility: CLINIC | Age: 64
End: 2024-02-01
Payer: COMMERCIAL

## 2024-02-01 VITALS
HEART RATE: 68 BPM | WEIGHT: 180 LBS | DIASTOLIC BLOOD PRESSURE: 78 MMHG | HEIGHT: 64 IN | OXYGEN SATURATION: 98 % | BODY MASS INDEX: 30.73 KG/M2 | SYSTOLIC BLOOD PRESSURE: 130 MMHG

## 2024-02-01 DIAGNOSIS — M85.80 OTHER SPECIFIED DISORDERS OF BONE DENSITY AND STRUCTURE, UNSPECIFIED SITE: ICD-10-CM

## 2024-02-01 DIAGNOSIS — E04.1 NONTOXIC SINGLE THYROID NODULE: ICD-10-CM

## 2024-02-01 PROCEDURE — 99213 OFFICE O/P EST LOW 20 MIN: CPT

## 2024-02-01 NOTE — PHYSICAL EXAM
[Alert] : alert [Well Nourished] : well nourished [No Acute Distress] : no acute distress [Normal Sclera/Conjunctiva] : normal sclera/conjunctiva [EOMI] : extra ocular movement intact [Normal Outer Ear/Nose] : the ears and nose were normal in appearance [Normal Hearing] : hearing was normal [No Neck Mass] : no neck mass was observed [No Respiratory Distress] : no respiratory distress [Normal Rate] : heart rate was normal [Regular Rhythm] : with a regular rhythm [Not Tender] : non-tender [Soft] : abdomen soft [Normal Gait] : normal gait [Normal Strength/Tone] : muscle strength and tone were normal [No Tremors] : no tremors [Oriented x3] : oriented to person, place, and time [Normal Insight/Judgement] : insight and judgment were intact [Supple] : the neck was supple [Thyroid Not Enlarged] : the thyroid was not enlarged [No Accessory Muscle Use] : no accessory muscle use [Normal Rate and Effort] : normal respiratory rate and effort

## 2024-02-05 ENCOUNTER — TRANSCRIPTION ENCOUNTER (OUTPATIENT)
Age: 64
End: 2024-02-05

## 2024-02-05 NOTE — ASSESSMENT
[FreeTextEntry1] : 63 year old female with history of multiple sub-cm nodules, osteopenia here for evaluation.   #Thyroid Nodules  -Ultrasound reviewed with patient.  -Multiple sub-cm nodules noted under < 1 cm with spongiform appearance -Not meeting FNA criteria at the time  -Will follow up ultrasound in 18 months  -check us thyroid  -check tsh w/ reflex   Osteopenia  -DEXA 2020 L-spine: -1.8, Left neck: -0.1, total left -0.2 -Vit D 25 OH 42.9 in Aug 2023  -Reports DEXA recently repeated w/ GYN who is now managing osteopenia  -Continue Vitamin D and calcium supplementation  -No recent falls/fractures  -Weight bearing exercises are recommended  -Recommend repeat DEXA [id number]  -Patient declined repeat DEXA order, wishes to follow up w/ GYN for this   RTC in 1 year

## 2024-02-05 NOTE — REVIEW OF SYSTEMS
[All other systems negative] : All other systems negative [Fatigue] : no fatigue [Decreased Appetite] : appetite not decreased [Recent Weight Gain (___ Lbs)] : no recent weight gain [Recent Weight Loss (___ Lbs)] : no recent weight loss [Dysphagia] : no dysphagia [Neck Pain] : no neck pain [Dysphonia] : no dysphonia [Chest Pain] : no chest pain [Palpitations] : no palpitations [Shortness Of Breath] : no shortness of breath [Cough] : no cough [Nausea] : no nausea [Abdominal Pain] : no abdominal pain [Vomiting] : no vomiting [Polyuria] : no polyuria [Joint Pain] : no joint pain [Back Pain] : no back pain [Acanthosis] : no acanthosis  [Polydipsia] : no polydipsia [Cold Intolerance] : no cold intolerance [Heat Intolerance] : no heat intolerance

## 2024-02-05 NOTE — HISTORY OF PRESENT ILLNESS
[FreeTextEntry1] : 63 year old female here for follow up of thyroid nodules.   Former patient of Dr. Gricelda Moreno. Last visit August 2022.    Thyroid Nodules:  15 years ago patient had a biopsy with Dr. Roa and it resulted as a cyst.  Since then she was following up with Dr. Delvalle & Dr. Moreno who had been monitoring her nodules   Most recent ultrasound in August 2022, multiple sub cm nodules noted bilaterally. Report reproduced below.  Thyroid Ultrasound Report    Technique: multiple real time longitudinal and transverse images were obtained using a high resolution ultrasound with a linear transducer, [employer] e 2008 model, 10-12 MHz frequencies. All measurements will be reported as longitudinal x tyler-posterior x transverse.   Findings:  The right thyroid lobe measures 3.83 x 1.86 x 1.66 cm. The left thyroid lobe measures 3.88 x 1.88 x 1.33 cm. The isthmus measures 0.39 cm.   The right thyroid lobe has a homogenous parenchyma.   The left thyroid lobe has a homogeneous parenchyma.     In the right upper pole there is a  hypoechoic nodule. It measures 0.95 x 0.45 x 0.61 cm. The nodule is ovoid in shape and the border is smooth and indistinct. It demonstrates no calcification. It has peripheral vascularity. Doppler grade 2 on power doppler.    In the right mid pole there is a . It measures 0.81 x 0.3 x 0.39 cm. The nodule is ovoid in shape and the border is smooth. The nodule has a thin halo. It demonstrates no calcifications. It has peripheral vascularity. Doppler grade 2 on power doppler.    In the right lower pole there is a  hypoechoic nodule. It measures 0.57 x 0.39 x 0.52 cm. The nodule is ovoid in shape and the border is smooth. The nodule has a thin halo. It demonstrates no calcifications. It has peripheral vascularity. Doppler grade 2 on power doppler.    In the left upper pole there is a  mixed. It measures 0.91 x 0.52 x 0.73 cm. The nodule is ovoid in shape and the border is smooth. The nodule has a thin halo. It has peripheral vascularity. Doppler grade 2 on power doppler.    In the left lower pole there is a  spongiform appearance. It measures 0.76 x 0.36 x 0.56 cm. The nodule is ovoid in shape and the border is smooth. The nodule does not have a halo. It demonstrates no calcifications. It has peripheral vascularity. Doppler grade 2 on power doppler.   TSH 2.21 in August 2023  history of thyroiditis in the past ( 20 years ago) from prior documentation Never been on LT4   No compressive symptoms in the neck  No family history of thyroid cancer  No history of head or neck radiation exposure   Patient also with hx of Osteopenia.  Reports now following with GYN for this.  Last DEXA seen in [employer] from 2020 - Patient reports DEXA was repeated more recently.  Takes Ca/D3.  No recent falls/fractures.   Works as a school nurse.

## 2024-02-07 ENCOUNTER — APPOINTMENT (OUTPATIENT)
Dept: ENDOCRINOLOGY | Facility: CLINIC | Age: 64
End: 2024-02-07

## 2024-02-19 ENCOUNTER — TRANSCRIPTION ENCOUNTER (OUTPATIENT)
Age: 64
End: 2024-02-19

## 2024-02-19 ENCOUNTER — APPOINTMENT (OUTPATIENT)
Dept: INTERNAL MEDICINE | Facility: CLINIC | Age: 64
End: 2024-02-19

## 2024-02-20 ENCOUNTER — TRANSCRIPTION ENCOUNTER (OUTPATIENT)
Age: 64
End: 2024-02-20

## 2024-02-23 ENCOUNTER — APPOINTMENT (OUTPATIENT)
Dept: ULTRASOUND IMAGING | Facility: CLINIC | Age: 64
End: 2024-02-23
Payer: COMMERCIAL

## 2024-02-23 PROCEDURE — 76536 US EXAM OF HEAD AND NECK: CPT

## 2024-02-28 ENCOUNTER — TRANSCRIPTION ENCOUNTER (OUTPATIENT)
Age: 64
End: 2024-02-28

## 2024-02-29 ENCOUNTER — TRANSCRIPTION ENCOUNTER (OUTPATIENT)
Age: 64
End: 2024-02-29

## 2024-05-04 ENCOUNTER — TRANSCRIPTION ENCOUNTER (OUTPATIENT)
Age: 64
End: 2024-05-04

## 2024-05-04 RX ORDER — ROSUVASTATIN CALCIUM 10 MG/1
10 TABLET, FILM COATED ORAL
Qty: 90 | Refills: 1 | Status: ACTIVE | COMMUNITY
Start: 2023-02-14 | End: 1900-01-01

## 2024-05-06 ENCOUNTER — TRANSCRIPTION ENCOUNTER (OUTPATIENT)
Age: 64
End: 2024-05-06

## 2024-06-06 NOTE — ED PROVIDER NOTE - NS ED MD DISPO DISCHARGE
Patient with acute kidney injury/acute renal failure likely due to pre-renal azotemia due to dehydration ASIA is currently  resolved . Baseline creatinine  1.1  - Labs reviewed- Renal function/electrolytes with Estimated Creatinine Clearance: 66.3 mL/min (based on SCr of 1.2 mg/dL). according to latest data. Monitor urine output and serial BMP and adjust therapy as needed. Avoid nephrotoxins and renally dose meds for GFR listed above.   Home

## 2024-06-19 ENCOUNTER — APPOINTMENT (OUTPATIENT)
Dept: INTERNAL MEDICINE | Facility: CLINIC | Age: 64
End: 2024-06-19
Payer: COMMERCIAL

## 2024-06-19 ENCOUNTER — NON-APPOINTMENT (OUTPATIENT)
Age: 64
End: 2024-06-19

## 2024-06-19 VITALS
SYSTOLIC BLOOD PRESSURE: 150 MMHG | BODY MASS INDEX: 32.78 KG/M2 | RESPIRATION RATE: 14 BRPM | OXYGEN SATURATION: 98 % | HEIGHT: 64 IN | TEMPERATURE: 98.3 F | WEIGHT: 192 LBS | HEART RATE: 82 BPM | DIASTOLIC BLOOD PRESSURE: 90 MMHG

## 2024-06-19 DIAGNOSIS — E78.00 PURE HYPERCHOLESTEROLEMIA, UNSPECIFIED: ICD-10-CM

## 2024-06-19 DIAGNOSIS — E66.9 OBESITY, UNSPECIFIED: ICD-10-CM

## 2024-06-19 PROCEDURE — G2211 COMPLEX E/M VISIT ADD ON: CPT

## 2024-06-19 PROCEDURE — 99214 OFFICE O/P EST MOD 30 MIN: CPT

## 2024-06-19 NOTE — HEALTH RISK ASSESSMENT
[Good] : ~his/her~  mood as  good [No falls in past year] : Patient reported no falls in the past year [0] : 2) Feeling down, depressed, or hopeless: Not at all (0) [[id number]] : 0 [Patient reported mammogram was normal] : Patient reported mammogram was normal [Patient reported colonoscopy was normal] : Patient reported colonoscopy was normal [MammogramDate] : 01/24 [ColonoscopyDate] : 08/20

## 2024-06-19 NOTE — ASSESSMENT
[FreeTextEntry1] : Hyperlipidemia- check lipid panel today. Continue Rosuvastatin 10mg daily Depression- continue duloxetine 60mg daily Follow up with psychiatrist.  CPE in 10/24

## 2024-06-19 NOTE — HISTORY OF PRESENT ILLNESS
[de-identified] : History of hyperlipidemia- stable on meds History of depression- followed by psychiatrist t. stable on meds.  Followed by endo for thyroid nodules.

## 2024-07-05 DIAGNOSIS — R79.9 ABNORMAL FINDING OF BLOOD CHEMISTRY, UNSPECIFIED: ICD-10-CM

## 2024-07-18 LAB
ANION GAP SERPL CALC-SCNC: 16 MMOL/L
BUN SERPL-MCNC: 22 MG/DL
CALCIUM SERPL-MCNC: 9.4 MG/DL
CHLORIDE SERPL-SCNC: 99 MMOL/L
CO2 SERPL-SCNC: 22 MMOL/L
CREAT SERPL-MCNC: 0.88 MG/DL
EGFR: 72 ML/MIN/1.73M2
GLUCOSE SERPL-MCNC: 72 MG/DL
POTASSIUM SERPL-SCNC: 4.4 MMOL/L
SODIUM SERPL-SCNC: 137 MMOL/L

## 2024-10-23 ENCOUNTER — APPOINTMENT (OUTPATIENT)
Dept: INTERNAL MEDICINE | Facility: CLINIC | Age: 64
End: 2024-10-23

## 2024-10-23 ENCOUNTER — NON-APPOINTMENT (OUTPATIENT)
Age: 64
End: 2024-10-23

## 2024-10-23 VITALS
OXYGEN SATURATION: 97 % | HEART RATE: 83 BPM | HEIGHT: 64 IN | BODY MASS INDEX: 32.95 KG/M2 | SYSTOLIC BLOOD PRESSURE: 150 MMHG | DIASTOLIC BLOOD PRESSURE: 80 MMHG | WEIGHT: 193 LBS

## 2024-10-23 DIAGNOSIS — E66.9 OBESITY, UNSPECIFIED: ICD-10-CM

## 2024-10-23 DIAGNOSIS — E04.1 NONTOXIC SINGLE THYROID NODULE: ICD-10-CM

## 2024-10-23 DIAGNOSIS — Z23 ENCOUNTER FOR IMMUNIZATION: ICD-10-CM

## 2024-10-23 DIAGNOSIS — F41.9 ANXIETY DISORDER, UNSPECIFIED: ICD-10-CM

## 2024-10-23 DIAGNOSIS — M85.80 OTHER SPECIFIED DISORDERS OF BONE DENSITY AND STRUCTURE, UNSPECIFIED SITE: ICD-10-CM

## 2024-10-23 DIAGNOSIS — Z87.898 PERSONAL HISTORY OF OTHER SPECIFIED CONDITIONS: ICD-10-CM

## 2024-10-23 DIAGNOSIS — F32.A ANXIETY DISORDER, UNSPECIFIED: ICD-10-CM

## 2024-10-23 PROCEDURE — G0403: CPT

## 2024-10-23 PROCEDURE — 99214 OFFICE O/P EST MOD 30 MIN: CPT | Mod: 25

## 2024-10-23 PROCEDURE — G0402 INITIAL PREVENTIVE EXAM: CPT

## 2024-10-24 ENCOUNTER — TRANSCRIPTION ENCOUNTER (OUTPATIENT)
Age: 64
End: 2024-10-24

## 2024-10-28 ENCOUNTER — RX RENEWAL (OUTPATIENT)
Age: 64
End: 2024-10-28

## 2024-10-30 ENCOUNTER — APPOINTMENT (OUTPATIENT)
Dept: INTERNAL MEDICINE | Facility: CLINIC | Age: 64
End: 2024-10-30

## 2024-11-01 ENCOUNTER — TRANSCRIPTION ENCOUNTER (OUTPATIENT)
Age: 64
End: 2024-11-01

## 2024-11-21 ENCOUNTER — TRANSCRIPTION ENCOUNTER (OUTPATIENT)
Age: 64
End: 2024-11-21

## 2024-11-26 ENCOUNTER — APPOINTMENT (OUTPATIENT)
Dept: INTERNAL MEDICINE | Facility: CLINIC | Age: 64
End: 2024-11-26
Payer: MEDICARE

## 2024-11-26 DIAGNOSIS — F41.9 ANXIETY DISORDER, UNSPECIFIED: ICD-10-CM

## 2024-11-26 DIAGNOSIS — E78.00 PURE HYPERCHOLESTEROLEMIA, UNSPECIFIED: ICD-10-CM

## 2024-11-26 DIAGNOSIS — F32.A ANXIETY DISORDER, UNSPECIFIED: ICD-10-CM

## 2024-11-26 DIAGNOSIS — E66.9 OBESITY, UNSPECIFIED: ICD-10-CM

## 2024-11-26 PROCEDURE — 99214 OFFICE O/P EST MOD 30 MIN: CPT

## 2024-11-26 PROCEDURE — 99204 OFFICE O/P NEW MOD 45 MIN: CPT

## 2024-11-27 LAB
MEV IGG FLD QL IA: >300 AU/ML
MEV IGG+IGM SER-IMP: POSITIVE
MUV AB SER-ACNC: POSITIVE
MUV IGG SER QL IA: >300 AU/ML
RUBV IGG FLD-ACNC: 27.7 INDEX
RUBV IGG SER-IMP: POSITIVE
VZV AB TITR SER: POSITIVE
VZV IGG SER IF-ACNC: 35.9 S/CO

## 2024-11-30 LAB
M TB IFN-G BLD-IMP: NEGATIVE
QUANTIFERON TB PLUS MITOGEN MINUS NIL: 9.05 IU/ML
QUANTIFERON TB PLUS NIL: 0.02 IU/ML
QUANTIFERON TB PLUS TB1 MINUS NIL: 0.03 IU/ML
QUANTIFERON TB PLUS TB2 MINUS NIL: 0.01 IU/ML

## 2024-12-09 ENCOUNTER — APPOINTMENT (OUTPATIENT)
Dept: INTERNAL MEDICINE | Facility: CLINIC | Age: 64
End: 2024-12-09
Payer: MEDICARE

## 2024-12-09 DIAGNOSIS — E78.00 PURE HYPERCHOLESTEROLEMIA, UNSPECIFIED: ICD-10-CM

## 2024-12-09 DIAGNOSIS — F32.A ANXIETY DISORDER, UNSPECIFIED: ICD-10-CM

## 2024-12-09 DIAGNOSIS — F41.9 ANXIETY DISORDER, UNSPECIFIED: ICD-10-CM

## 2024-12-09 DIAGNOSIS — E66.9 OBESITY, UNSPECIFIED: ICD-10-CM

## 2024-12-09 PROCEDURE — 99443: CPT | Mod: 93

## 2024-12-09 RX ORDER — TELMISARTAN 40 MG/1
40 TABLET ORAL DAILY
Qty: 30 | Refills: 3 | Status: ACTIVE | COMMUNITY
Start: 2024-12-09 | End: 1900-01-01

## 2024-12-10 ENCOUNTER — TRANSCRIPTION ENCOUNTER (OUTPATIENT)
Age: 64
End: 2024-12-10

## 2024-12-11 ENCOUNTER — TRANSCRIPTION ENCOUNTER (OUTPATIENT)
Age: 64
End: 2024-12-11

## 2024-12-12 ENCOUNTER — TRANSCRIPTION ENCOUNTER (OUTPATIENT)
Age: 64
End: 2024-12-12

## 2024-12-20 ENCOUNTER — TRANSCRIPTION ENCOUNTER (OUTPATIENT)
Age: 64
End: 2024-12-20

## 2025-01-06 ENCOUNTER — TRANSCRIPTION ENCOUNTER (OUTPATIENT)
Age: 65
End: 2025-01-06

## 2025-01-07 ENCOUNTER — TRANSCRIPTION ENCOUNTER (OUTPATIENT)
Age: 65
End: 2025-01-07

## 2025-01-16 ENCOUNTER — TRANSCRIPTION ENCOUNTER (OUTPATIENT)
Age: 65
End: 2025-01-16

## 2025-01-17 ENCOUNTER — APPOINTMENT (OUTPATIENT)
Dept: ULTRASOUND IMAGING | Facility: CLINIC | Age: 65
End: 2025-01-17

## 2025-01-17 ENCOUNTER — RESULT REVIEW (OUTPATIENT)
Age: 65
End: 2025-01-17

## 2025-01-17 ENCOUNTER — APPOINTMENT (OUTPATIENT)
Dept: MAMMOGRAPHY | Facility: CLINIC | Age: 65
End: 2025-01-17
Payer: MEDICARE

## 2025-01-17 ENCOUNTER — APPOINTMENT (OUTPATIENT)
Dept: RADIOLOGY | Facility: CLINIC | Age: 65
End: 2025-01-17
Payer: MEDICARE

## 2025-01-17 PROCEDURE — 77080 DXA BONE DENSITY AXIAL: CPT

## 2025-01-17 PROCEDURE — 77063 BREAST TOMOSYNTHESIS BI: CPT

## 2025-01-17 PROCEDURE — 76641 ULTRASOUND BREAST COMPLETE: CPT | Mod: 50,GA

## 2025-01-17 PROCEDURE — 77067 SCR MAMMO BI INCL CAD: CPT

## 2025-01-21 ENCOUNTER — TRANSCRIPTION ENCOUNTER (OUTPATIENT)
Age: 65
End: 2025-01-21

## 2025-01-27 ENCOUNTER — RX RENEWAL (OUTPATIENT)
Age: 65
End: 2025-01-27

## 2025-02-05 ENCOUNTER — APPOINTMENT (OUTPATIENT)
Dept: UROLOGY | Facility: CLINIC | Age: 65
End: 2025-02-05
Payer: MEDICARE

## 2025-02-05 VITALS
BODY MASS INDEX: 32.1 KG/M2 | HEIGHT: 64 IN | OXYGEN SATURATION: 98 % | SYSTOLIC BLOOD PRESSURE: 136 MMHG | DIASTOLIC BLOOD PRESSURE: 83 MMHG | HEART RATE: 73 BPM | WEIGHT: 188 LBS

## 2025-02-05 DIAGNOSIS — N39.3 STRESS INCONTINENCE (FEMALE) (MALE): ICD-10-CM

## 2025-02-05 DIAGNOSIS — N39.46 MIXED INCONTINENCE: ICD-10-CM

## 2025-02-05 DIAGNOSIS — Z86.79 PERSONAL HISTORY OF OTHER DISEASES OF THE CIRCULATORY SYSTEM: ICD-10-CM

## 2025-02-05 DIAGNOSIS — Z80.8 FAMILY HISTORY OF MALIGNANT NEOPLASM OF OTHER ORGANS OR SYSTEMS: ICD-10-CM

## 2025-02-05 DIAGNOSIS — R33.9 RETENTION OF URINE, UNSPECIFIED: ICD-10-CM

## 2025-02-05 PROCEDURE — 99459 PELVIC EXAMINATION: CPT

## 2025-02-05 PROCEDURE — 99204 OFFICE O/P NEW MOD 45 MIN: CPT

## 2025-02-05 PROCEDURE — G2211 COMPLEX E/M VISIT ADD ON: CPT

## 2025-02-05 PROCEDURE — 51798 US URINE CAPACITY MEASURE: CPT

## 2025-02-06 LAB
APPEARANCE: CLEAR
BACTERIA: NEGATIVE /HPF
BILIRUBIN URINE: NEGATIVE
BLOOD URINE: NEGATIVE
CAST: 0 /LPF
COLOR: YELLOW
EPITHELIAL CELLS: 3 /HPF
GLUCOSE QUALITATIVE U: NEGATIVE MG/DL
KETONES URINE: NEGATIVE MG/DL
LEUKOCYTE ESTERASE URINE: ABNORMAL
MICROSCOPIC-UA: NORMAL
NITRITE URINE: NEGATIVE
PH URINE: 5.5
PROTEIN URINE: NEGATIVE MG/DL
RED BLOOD CELLS URINE: 2 /HPF
SPECIFIC GRAVITY URINE: 1.02
UROBILINOGEN URINE: 0.2 MG/DL
WHITE BLOOD CELLS URINE: 4 /HPF

## 2025-02-07 ENCOUNTER — TRANSCRIPTION ENCOUNTER (OUTPATIENT)
Age: 65
End: 2025-02-07

## 2025-02-07 LAB — BACTERIA UR CULT: NORMAL

## 2025-02-12 ENCOUNTER — APPOINTMENT (OUTPATIENT)
Dept: INTERNAL MEDICINE | Facility: CLINIC | Age: 65
End: 2025-02-12
Payer: MEDICARE

## 2025-02-12 VITALS
WEIGHT: 187 LBS | SYSTOLIC BLOOD PRESSURE: 123 MMHG | BODY MASS INDEX: 31.92 KG/M2 | OXYGEN SATURATION: 97 % | HEIGHT: 64 IN | HEART RATE: 76 BPM | DIASTOLIC BLOOD PRESSURE: 85 MMHG

## 2025-02-12 DIAGNOSIS — I10 ESSENTIAL (PRIMARY) HYPERTENSION: ICD-10-CM

## 2025-02-12 DIAGNOSIS — F32.A ANXIETY DISORDER, UNSPECIFIED: ICD-10-CM

## 2025-02-12 DIAGNOSIS — E78.00 PURE HYPERCHOLESTEROLEMIA, UNSPECIFIED: ICD-10-CM

## 2025-02-12 DIAGNOSIS — F41.9 ANXIETY DISORDER, UNSPECIFIED: ICD-10-CM

## 2025-02-12 DIAGNOSIS — E04.1 NONTOXIC SINGLE THYROID NODULE: ICD-10-CM

## 2025-02-12 DIAGNOSIS — E66.9 OBESITY, UNSPECIFIED: ICD-10-CM

## 2025-02-12 PROCEDURE — G2211 COMPLEX E/M VISIT ADD ON: CPT

## 2025-02-12 PROCEDURE — 99214 OFFICE O/P EST MOD 30 MIN: CPT

## 2025-02-13 PROBLEM — I10 BENIGN ESSENTIAL HTN: Status: ACTIVE | Noted: 2025-02-13

## 2025-02-18 ENCOUNTER — TRANSCRIPTION ENCOUNTER (OUTPATIENT)
Age: 65
End: 2025-02-18

## 2025-02-26 ENCOUNTER — APPOINTMENT (OUTPATIENT)
Dept: ULTRASOUND IMAGING | Facility: CLINIC | Age: 65
End: 2025-02-26

## 2025-02-26 PROCEDURE — 76536 US EXAM OF HEAD AND NECK: CPT | Mod: 26

## 2025-03-10 ENCOUNTER — TRANSCRIPTION ENCOUNTER (OUTPATIENT)
Age: 65
End: 2025-03-10

## 2025-05-05 ENCOUNTER — TRANSCRIPTION ENCOUNTER (OUTPATIENT)
Age: 65
End: 2025-05-05

## 2025-05-06 ENCOUNTER — TRANSCRIPTION ENCOUNTER (OUTPATIENT)
Age: 65
End: 2025-05-06

## 2025-05-21 ENCOUNTER — NON-APPOINTMENT (OUTPATIENT)
Age: 65
End: 2025-05-21

## 2025-05-22 ENCOUNTER — APPOINTMENT (OUTPATIENT)
Dept: CARDIOLOGY | Facility: CLINIC | Age: 65
End: 2025-05-22
Payer: MEDICARE

## 2025-05-22 ENCOUNTER — NON-APPOINTMENT (OUTPATIENT)
Age: 65
End: 2025-05-22

## 2025-05-22 VITALS
WEIGHT: 187 LBS | BODY MASS INDEX: 31.92 KG/M2 | HEART RATE: 90 BPM | HEIGHT: 64 IN | DIASTOLIC BLOOD PRESSURE: 81 MMHG | OXYGEN SATURATION: 98 % | SYSTOLIC BLOOD PRESSURE: 130 MMHG

## 2025-05-22 DIAGNOSIS — E78.00 PURE HYPERCHOLESTEROLEMIA, UNSPECIFIED: ICD-10-CM

## 2025-05-22 DIAGNOSIS — R00.2 PALPITATIONS: ICD-10-CM

## 2025-05-22 DIAGNOSIS — I10 ESSENTIAL (PRIMARY) HYPERTENSION: ICD-10-CM

## 2025-05-22 PROCEDURE — 93000 ELECTROCARDIOGRAM COMPLETE: CPT

## 2025-05-22 PROCEDURE — 99204 OFFICE O/P NEW MOD 45 MIN: CPT
